# Patient Record
Sex: FEMALE | Race: WHITE | NOT HISPANIC OR LATINO | URBAN - METROPOLITAN AREA
[De-identification: names, ages, dates, MRNs, and addresses within clinical notes are randomized per-mention and may not be internally consistent; named-entity substitution may affect disease eponyms.]

---

## 2021-01-01 ENCOUNTER — EMERGENCY (EMERGENCY)
Facility: HOSPITAL | Age: 66
LOS: 1 days | Discharge: HOME | End: 2021-01-01
Attending: STUDENT IN AN ORGANIZED HEALTH CARE EDUCATION/TRAINING PROGRAM | Admitting: STUDENT IN AN ORGANIZED HEALTH CARE EDUCATION/TRAINING PROGRAM
Payer: COMMERCIAL

## 2021-01-01 VITALS
TEMPERATURE: 98 F | DIASTOLIC BLOOD PRESSURE: 85 MMHG | SYSTOLIC BLOOD PRESSURE: 145 MMHG | OXYGEN SATURATION: 100 % | HEART RATE: 80 BPM | RESPIRATION RATE: 16 BRPM

## 2021-01-01 VITALS
DIASTOLIC BLOOD PRESSURE: 93 MMHG | HEART RATE: 75 BPM | OXYGEN SATURATION: 100 % | SYSTOLIC BLOOD PRESSURE: 148 MMHG | TEMPERATURE: 98 F | RESPIRATION RATE: 17 BRPM

## 2021-01-01 DIAGNOSIS — Z96.643 PRESENCE OF ARTIFICIAL HIP JOINT, BILATERAL: Chronic | ICD-10-CM

## 2021-01-01 DIAGNOSIS — Z90.710 ACQUIRED ABSENCE OF BOTH CERVIX AND UTERUS: Chronic | ICD-10-CM

## 2021-01-01 DIAGNOSIS — R29.91 UNSPECIFIED SYMPTOMS AND SIGNS INVOLVING THE MUSCULOSKELETAL SYSTEM: Chronic | ICD-10-CM

## 2021-01-01 PROCEDURE — 99283 EMERGENCY DEPT VISIT LOW MDM: CPT

## 2021-01-01 RX ORDER — KETOROLAC TROMETHAMINE 30 MG/ML
30 SYRINGE (ML) INJECTION ONCE
Refills: 0 | Status: DISCONTINUED | OUTPATIENT
Start: 2021-01-01 | End: 2021-01-01

## 2021-01-01 RX ORDER — IBUPROFEN 200 MG
1 TABLET ORAL
Qty: 10 | Refills: 0
Start: 2021-01-01

## 2021-01-01 RX ORDER — LIDOCAINE 4 G/100G
1 CREAM TOPICAL ONCE
Refills: 0 | Status: COMPLETED | OUTPATIENT
Start: 2021-01-01 | End: 2021-01-01

## 2021-01-01 RX ORDER — OXYCODONE AND ACETAMINOPHEN 5; 325 MG/1; MG/1
1 TABLET ORAL ONCE
Refills: 0 | Status: DISCONTINUED | OUTPATIENT
Start: 2021-01-01 | End: 2021-01-01

## 2021-01-01 RX ORDER — CYCLOBENZAPRINE HYDROCHLORIDE 10 MG/1
5 TABLET, FILM COATED ORAL ONCE
Refills: 0 | Status: COMPLETED | OUTPATIENT
Start: 2021-01-01 | End: 2021-01-01

## 2021-01-01 RX ORDER — CYCLOBENZAPRINE HYDROCHLORIDE 10 MG/1
1 TABLET, FILM COATED ORAL
Qty: 10 | Refills: 0
Start: 2021-01-01

## 2021-01-01 RX ADMIN — LIDOCAINE 1 PATCH: 4 CREAM TOPICAL at 18:49

## 2021-01-01 RX ADMIN — Medication 30 MILLIGRAM(S): at 18:49

## 2021-01-01 RX ADMIN — CYCLOBENZAPRINE HYDROCHLORIDE 5 MILLIGRAM(S): 10 TABLET, FILM COATED ORAL at 18:52

## 2021-01-01 RX ADMIN — OXYCODONE AND ACETAMINOPHEN 1 TABLET(S): 5; 325 TABLET ORAL at 18:50

## 2021-01-01 NOTE — ED PROVIDER NOTE - OBJECTIVE STATEMENT
64 y/o female with a hx of Gout, HTN, Chronic back pain presents to the ER c/o 6 days of right sided low back pain radiating to her right leg.  Pt reports taking Tramadol at home with some relief.  Pt denies fall, trauma, fecal/urinary incontinence, fevers, chills, weakness, numbness, tingling.  Pt reports pain with walking/movement. Last pain medication was yesterday.

## 2021-01-01 NOTE — ED PROVIDER NOTE - PATIENT PORTAL LINK FT
You can access the FollowMyHealth Patient Portal offered by [facility] by registering at the following website: [email or web address]. By joining [employer]’s FollowMyHealth portal, you will also be able to view your health information using other applications (apps) compatible with our system.

## 2021-01-01 NOTE — ED PROVIDER NOTE - PROGRESS NOTE DETAILS
WILIAM Macedo: pt feels better pain improved pt ambulating with cane.  Discharge reviewed and discussed with patient.

## 2021-01-01 NOTE — ED PROVIDER NOTE - NSFOLLOWUPINSTRUCTIONS_ED_ALL_ED_FT
Follow up with your Doctor in 1-2 days.    Follow up with Spine Specialist in 1-2 days see attached list.  Heat to back.    Take Ibuprofen 600mg orally every 8 hours as needed for pain take with food.    Flexeril 5mg orally every 8 hours as needed for muscle spasm don't drive or drink alcohol while taking this medications.   Return to the ER for any persistent/worsening or new symptoms, weakness, numbness, difficulty urinating or any concerning symptoms.

## 2021-01-01 NOTE — ED ADULT TRIAGE NOTE - CHIEF COMPLAINT QUOTE
Patient states she has back pain since last week. Thought pain was caused by sciatica. Has been having difficulty walking since Monday. States pain is getting worse. Unable to bear weight on right leg. Denies falls or trauma. taking Tramadol at home but only helps slightly. Denies taking pain meds today. History of htn, bilateral hip replacements.

## 2021-01-01 NOTE — ED PROVIDER NOTE - PHYSICAL EXAMINATION
+TTP right sided paraspinal lumbar region, no midline TTP. +SLR(right), strength 5/5 all extremities, sensation equal and intact.

## 2021-01-01 NOTE — ED PROVIDER NOTE - RESPIRATORY, MLM
Afternoon glucose was 270. Pt refusing ordered sliding scale insulin. Writer educated pt on importance of glycemic control, but he continued to refuse. NP aware.    Breath sounds clear and equal bilaterally.

## 2021-01-01 NOTE — ED PROVIDER NOTE - PSH
Finding of knee region  b/l knee surgery  History of bilateral hip replacements    S/P hysterectomy

## 2021-01-01 NOTE — ED PROVIDER NOTE - CLINICAL SUMMARY MEDICAL DECISION MAKING FREE TEXT BOX
66 y/o female with a hx of Gout, HTN, Chronic back pain presents to the ER c/o 6 days of right sided low back pain radiating to her right leg. Pt is well appearing, NAD, normal neuro exam, exam consistent with sciatica, pain control, reassess, follow up PMD/Spine.

## 2021-01-01 NOTE — ED PROVIDER NOTE - ATTENDING CONTRIBUTION TO CARE
65F with pmh gout, HTN, chronic back pain, sciatica presenting with right sided back pain with radiation down right leg x 5-6 days with worsening pain. Tried taking home Tramadol with some relief but states increasing pain with difficulty ambulating. Endorses similar episodes in the past. Denies any inciting event or trauma. Denies fever, chills, cp, sob, nausea, vomiting, diarrhea, weakness, numbness, dysuria, urinary or bowel changes     GEN: NAD, awake, well appearing  HEENT: NCAT, MMM, normal conjunctiva, perrl  CHEST/LUNGS: Non-tachypneic, CTAB, bilateral breath sounds  CARDIAC: Non-tachycardic, s1s2, normal perfusion, no peripheral edema  ABDOMEN: Soft, NTND, No rebound/guarding  MSK: Pain reproducible with right leg movement. FROM of hips, knees and LE joints. Neurovasc intact. No joint tenderness, no gross deformity of extremities  SKIN: No rashes, no petechiae, no vesicles  NEURO: CN grossly intact, normal coordination, no focal motor or sensory deficits  PSYCH: Alert, appropriate, cooperative     Patient presenting with MSK pain likely acute on chronic exacerbation of lower back pain/sciatica. Will treat symptomatically and reassess.

## 2024-06-23 ENCOUNTER — EMERGENCY (EMERGENCY)
Facility: HOSPITAL | Age: 69
LOS: 1 days | Discharge: HOME | End: 2024-06-23
Attending: EMERGENCY MEDICINE
Payer: COMMERCIAL

## 2024-06-23 VITALS
DIASTOLIC BLOOD PRESSURE: 63 MMHG | OXYGEN SATURATION: 97 % | RESPIRATION RATE: 20 BRPM | HEART RATE: 69 BPM | SYSTOLIC BLOOD PRESSURE: 147 MMHG | TEMPERATURE: 99 F

## 2024-06-23 VITALS
OXYGEN SATURATION: 97 % | RESPIRATION RATE: 20 BRPM | HEART RATE: 63 BPM | SYSTOLIC BLOOD PRESSURE: 167 MMHG | HEIGHT: 66 IN | TEMPERATURE: 98 F | WEIGHT: 192.9 LBS | DIASTOLIC BLOOD PRESSURE: 88 MMHG

## 2024-06-23 DIAGNOSIS — Z96.643 PRESENCE OF ARTIFICIAL HIP JOINT, BILATERAL: Chronic | ICD-10-CM

## 2024-06-23 DIAGNOSIS — R29.91 UNSPECIFIED SYMPTOMS AND SIGNS INVOLVING THE MUSCULOSKELETAL SYSTEM: Chronic | ICD-10-CM

## 2024-06-23 DIAGNOSIS — Z90.710 ACQUIRED ABSENCE OF BOTH CERVIX AND UTERUS: Chronic | ICD-10-CM

## 2024-06-23 PROBLEM — I10 ESSENTIAL (PRIMARY) HYPERTENSION: Chronic | Status: ACTIVE | Noted: 2021-01-01

## 2024-06-23 PROBLEM — M54.9 DORSALGIA, UNSPECIFIED: Chronic | Status: ACTIVE | Noted: 2021-01-01

## 2024-06-23 LAB
ACETONE SERPL-MCNC: NEGATIVE — SIGNIFICANT CHANGE UP
ALBUMIN SERPL ELPH-MCNC: 3.7 G/DL — SIGNIFICANT CHANGE UP (ref 3.5–5)
ALP SERPL-CCNC: 105 U/L — SIGNIFICANT CHANGE UP (ref 40–120)
ALT FLD-CCNC: 30 U/L DA — SIGNIFICANT CHANGE UP (ref 10–60)
ANION GAP SERPL CALC-SCNC: 6 MMOL/L — SIGNIFICANT CHANGE UP (ref 5–17)
APPEARANCE UR: ABNORMAL
AST SERPL-CCNC: 25 U/L — SIGNIFICANT CHANGE UP (ref 10–40)
BACTERIA # UR AUTO: ABNORMAL /HPF
BASOPHILS # BLD AUTO: 0.05 K/UL — SIGNIFICANT CHANGE UP (ref 0–0.2)
BASOPHILS NFR BLD AUTO: 0.5 % — SIGNIFICANT CHANGE UP (ref 0–2)
BILIRUB SERPL-MCNC: 1.4 MG/DL — HIGH (ref 0.2–1.2)
BILIRUB UR-MCNC: ABNORMAL
BUN SERPL-MCNC: 16 MG/DL — SIGNIFICANT CHANGE UP (ref 7–18)
CALCIUM SERPL-MCNC: 9.6 MG/DL — SIGNIFICANT CHANGE UP (ref 8.4–10.5)
CHLORIDE SERPL-SCNC: 109 MMOL/L — HIGH (ref 96–108)
CO2 SERPL-SCNC: 26 MMOL/L — SIGNIFICANT CHANGE UP (ref 22–31)
COLOR SPEC: SIGNIFICANT CHANGE UP
CREAT SERPL-MCNC: 0.84 MG/DL — SIGNIFICANT CHANGE UP (ref 0.5–1.3)
DIFF PNL FLD: ABNORMAL
EGFR: 75 ML/MIN/1.73M2 — SIGNIFICANT CHANGE UP
EOSINOPHIL # BLD AUTO: 0.05 K/UL — SIGNIFICANT CHANGE UP (ref 0–0.5)
EOSINOPHIL NFR BLD AUTO: 0.5 % — SIGNIFICANT CHANGE UP (ref 0–6)
EPI CELLS # UR: PRESENT
GLUCOSE SERPL-MCNC: 105 MG/DL — HIGH (ref 70–99)
GLUCOSE UR QL: NEGATIVE MG/DL — SIGNIFICANT CHANGE UP
HCT VFR BLD CALC: 50.7 % — HIGH (ref 34.5–45)
HGB BLD-MCNC: 16.6 G/DL — HIGH (ref 11.5–15.5)
HYALINE CASTS # UR AUTO: PRESENT
IMM GRANULOCYTES NFR BLD AUTO: 0.2 % — SIGNIFICANT CHANGE UP (ref 0–0.9)
KETONES UR-MCNC: 15 MG/DL
LEUKOCYTE ESTERASE UR-ACNC: ABNORMAL
LIDOCAIN IGE QN: 28 U/L — SIGNIFICANT CHANGE UP (ref 13–75)
LYMPHOCYTES # BLD AUTO: 1.95 K/UL — SIGNIFICANT CHANGE UP (ref 1–3.3)
LYMPHOCYTES # BLD AUTO: 20.2 % — SIGNIFICANT CHANGE UP (ref 13–44)
MAGNESIUM SERPL-MCNC: 2.3 MG/DL — SIGNIFICANT CHANGE UP (ref 1.6–2.6)
MCHC RBC-ENTMCNC: 30.6 PG — SIGNIFICANT CHANGE UP (ref 27–34)
MCHC RBC-ENTMCNC: 32.7 GM/DL — SIGNIFICANT CHANGE UP (ref 32–36)
MCV RBC AUTO: 93.5 FL — SIGNIFICANT CHANGE UP (ref 80–100)
MONOCYTES # BLD AUTO: 0.71 K/UL — SIGNIFICANT CHANGE UP (ref 0–0.9)
MONOCYTES NFR BLD AUTO: 7.4 % — SIGNIFICANT CHANGE UP (ref 2–14)
NEUTROPHILS # BLD AUTO: 6.85 K/UL — SIGNIFICANT CHANGE UP (ref 1.8–7.4)
NEUTROPHILS NFR BLD AUTO: 71.2 % — SIGNIFICANT CHANGE UP (ref 43–77)
NITRITE UR-MCNC: NEGATIVE — SIGNIFICANT CHANGE UP
NRBC # BLD: 0 /100 WBCS — SIGNIFICANT CHANGE UP (ref 0–0)
PH UR: 6.5 — SIGNIFICANT CHANGE UP (ref 5–8)
PLATELET # BLD AUTO: 216 K/UL — SIGNIFICANT CHANGE UP (ref 150–400)
POTASSIUM SERPL-MCNC: 3.9 MMOL/L — SIGNIFICANT CHANGE UP (ref 3.5–5.3)
POTASSIUM SERPL-SCNC: 3.9 MMOL/L — SIGNIFICANT CHANGE UP (ref 3.5–5.3)
PROT SERPL-MCNC: 7.6 G/DL — SIGNIFICANT CHANGE UP (ref 6–8.3)
PROT UR-MCNC: ABNORMAL MG/DL
RBC # BLD: 5.42 M/UL — HIGH (ref 3.8–5.2)
RBC # FLD: 14.1 % — SIGNIFICANT CHANGE UP (ref 10.3–14.5)
RBC CASTS # UR COMP ASSIST: 2 /HPF — SIGNIFICANT CHANGE UP (ref 0–4)
SODIUM SERPL-SCNC: 141 MMOL/L — SIGNIFICANT CHANGE UP (ref 135–145)
SP GR SPEC: 1.02 — SIGNIFICANT CHANGE UP (ref 1–1.03)
TROPONIN I, HIGH SENSITIVITY RESULT: 49.7 NG/L — SIGNIFICANT CHANGE UP
TROPONIN I, HIGH SENSITIVITY RESULT: 54.7 NG/L — HIGH
UROBILINOGEN FLD QL: 1 MG/DL — SIGNIFICANT CHANGE UP (ref 0.2–1)
WBC # BLD: 9.63 K/UL — SIGNIFICANT CHANGE UP (ref 3.8–10.5)
WBC # FLD AUTO: 9.63 K/UL — SIGNIFICANT CHANGE UP (ref 3.8–10.5)
WBC UR QL: 7 /HPF — HIGH (ref 0–5)

## 2024-06-23 PROCEDURE — 81001 URINALYSIS AUTO W/SCOPE: CPT

## 2024-06-23 PROCEDURE — 83735 ASSAY OF MAGNESIUM: CPT

## 2024-06-23 PROCEDURE — 87086 URINE CULTURE/COLONY COUNT: CPT

## 2024-06-23 PROCEDURE — 83690 ASSAY OF LIPASE: CPT

## 2024-06-23 PROCEDURE — 99285 EMERGENCY DEPT VISIT HI MDM: CPT

## 2024-06-23 PROCEDURE — 96375 TX/PRO/DX INJ NEW DRUG ADDON: CPT

## 2024-06-23 PROCEDURE — 93010 ELECTROCARDIOGRAM REPORT: CPT

## 2024-06-23 PROCEDURE — 36415 COLL VENOUS BLD VENIPUNCTURE: CPT

## 2024-06-23 PROCEDURE — 96374 THER/PROPH/DIAG INJ IV PUSH: CPT

## 2024-06-23 PROCEDURE — 82009 KETONE BODYS QUAL: CPT

## 2024-06-23 PROCEDURE — 71045 X-RAY EXAM CHEST 1 VIEW: CPT

## 2024-06-23 PROCEDURE — 93005 ELECTROCARDIOGRAM TRACING: CPT

## 2024-06-23 PROCEDURE — 71045 X-RAY EXAM CHEST 1 VIEW: CPT | Mod: 26

## 2024-06-23 PROCEDURE — 84484 ASSAY OF TROPONIN QUANT: CPT

## 2024-06-23 PROCEDURE — 85025 COMPLETE CBC W/AUTO DIFF WBC: CPT

## 2024-06-23 PROCEDURE — 99285 EMERGENCY DEPT VISIT HI MDM: CPT | Mod: 25

## 2024-06-23 PROCEDURE — 80053 COMPREHEN METABOLIC PANEL: CPT

## 2024-06-23 RX ORDER — FAMOTIDINE 10 MG/ML
1 INJECTION INTRAVENOUS
Qty: 60 | Refills: 0
Start: 2024-06-23 | End: 2024-07-22

## 2024-06-23 RX ORDER — ASPIRIN/CALCIUM CARB/MAGNESIUM 324 MG
162 TABLET ORAL ONCE
Refills: 0 | Status: COMPLETED | OUTPATIENT
Start: 2024-06-23 | End: 2024-06-23

## 2024-06-23 RX ORDER — FAMOTIDINE 10 MG/ML
20 INJECTION INTRAVENOUS ONCE
Refills: 0 | Status: COMPLETED | OUTPATIENT
Start: 2024-06-23 | End: 2024-06-23

## 2024-06-23 RX ORDER — SODIUM CHLORIDE 9 MG/ML
1000 INJECTION INTRAMUSCULAR; INTRAVENOUS; SUBCUTANEOUS
Refills: 0 | Status: ACTIVE | OUTPATIENT
Start: 2024-06-23 | End: 2025-05-22

## 2024-06-23 RX ORDER — ONDANSETRON 8 MG/1
1 TABLET, FILM COATED ORAL
Qty: 15 | Refills: 0
Start: 2024-06-23 | End: 2024-06-27

## 2024-06-23 RX ORDER — ONDANSETRON 8 MG/1
4 TABLET, FILM COATED ORAL ONCE
Refills: 0 | Status: COMPLETED | OUTPATIENT
Start: 2024-06-23 | End: 2024-06-23

## 2024-06-23 RX ADMIN — ONDANSETRON 4 MILLIGRAM(S): 8 TABLET, FILM COATED ORAL at 13:16

## 2024-06-23 RX ADMIN — FAMOTIDINE 20 MILLIGRAM(S): 10 INJECTION INTRAVENOUS at 13:13

## 2024-06-23 RX ADMIN — Medication 162 MILLIGRAM(S): at 14:34

## 2024-06-23 RX ADMIN — SODIUM CHLORIDE 125 MILLILITER(S): 9 INJECTION INTRAMUSCULAR; INTRAVENOUS; SUBCUTANEOUS at 13:13

## 2024-06-23 NOTE — ED ADULT NURSE NOTE - OBJECTIVE STATEMENT
Pt c/o abdominal pain/soft bowel movement/nausea since Friday 6/21/24 with loss appetite. Pt was able to drink tea last night and woke up this morning with nausea. Pt currently feeling weak and shakiness. Pt denies dizziness/lightheaded/ Pt PMH: high blood pressure. No acute distress noted. Pt c/o abdominal pain/soft bowel movement/nausea since Friday 6/21/24 with loss appetite. Pt was able to drink tea last night and woke up this morning with nausea and 3 loose bowel movements today. Pt currently feeling weak w/ shakiness. Pt denies dizziness/lightheaded/ Pt PMH: high blood pressure. No acute distress noted. Pt c/o abdominal pain/soft bowel movement/nausea since Friday 6/21/24 with loss appetite. Pt was able to drink tea last night and woke up this morning with nausea and 3 loose bowel movements today. Pt currently feeling weak w/ shakiness. Pt denies recent travel/dizziness/lightheaded/ Pt PMH: high blood pressure. No acute distress noted.

## 2024-06-23 NOTE — ED PROVIDER NOTE - PATIENT PORTAL LINK FT
You can access the FollowMyHealth Patient Portal offered by [facility] by registering at the following website: [email or web address]. By joining [employer]’s FollowMyHealth portal, you will also be able to view your health information using other applications (apps) compatible with our system.

## 2024-06-23 NOTE — ED PROVIDER NOTE - PROGRESS NOTE DETAILS
Labs/EKG explained to patient   patient's troponin slightly elevated, will get second troponin in a few hours   patient claims she is feeling a little better Patient is feeling better   repeat troponin is negative, will DC home   advised to follow-up with GI and cardiology   patient prefers getting a referral from her doctor  Patient with no urinary symptoms, UA mostly from contamination

## 2024-06-23 NOTE — ED ADULT NURSE NOTE - HIV OFFER
Problem: At Risk for Falls  Goal: # Patient does not fall  Outcome: Outcome Met, Complete Goal  Goal: # Takes action to control fall-related risks  Outcome: Outcome Met, Complete Goal  Goal: # Verbalizes understanding of fall risk/precautions  Description: Document education using the patient education activity  Outcome: Outcome Met, Complete Goal     Problem: At Risk for Injury Due to Fall  Goal: # Patient does not fall  Outcome: Outcome Met, Complete Goal  Goal: # Takes action to control condition specific risks  Outcome: Outcome Met, Complete Goal  Goal: # Verbalizes understanding of fall-related injury personal risks  Description: Document education using the patient education activity  Outcome: Outcome Met, Complete Goal      Opt out

## 2024-06-23 NOTE — ED PROVIDER NOTE - NSICDXPASTSURGICALHX_GEN_ALL_CORE_FT
PAST SURGICAL HISTORY:  Finding of knee region b/l knee surgery    History of bilateral hip replacements     S/P hysterectomy

## 2024-06-23 NOTE — ED PROVIDER NOTE - CLINICAL SUMMARY MEDICAL DECISION MAKING FREE TEXT BOX
68-year-old female with history of HTN, complaining of constant nonradiating epigastric pain, nauseous, sweatiness, concern for gastritis, ACS given history of HTN and is postmenopausal, unlikely cholelithiasis, pancreatitis.  Will get labs, EKG, give Pepcid, IV fluids and reassess

## 2024-06-23 NOTE — ED ADULT NURSE NOTE - NSFALLUNIVINTERV_ED_ALL_ED
Bed/Stretcher in lowest position, wheels locked, appropriate side rails in place/Call bell, personal items and telephone in reach/Instruct patient to call for assistance before getting out of bed/chair/stretcher/Non-slip footwear applied when patient is off stretcher/[place] to call system/Physically safe environment - no spills, clutter or unnecessary equipment/Purposeful proactive rounding/Room/bathroom lighting operational, light cord in reach

## 2024-06-23 NOTE — ED PROVIDER NOTE - NSFOLLOWUPINSTRUCTIONS_ED_ALL_ED_FT
Gastritis, Adult  Outline of an adult's lower body with a close-up of the stomach, showing inflammation and an ulcer inside the stomach.   Gastritis is inflammation of the stomach. There are two kinds of gastritis:  Acute gastritis. This kind develops suddenly.  Chronic gastritis. This kind is much more common. It develops slowly and lasts for a long time.  Gastritis happens when the lining of the stomach becomes weak or gets damaged. Without treatment, gastritis can lead to stomach bleeding and ulcers.    What are the causes?  This condition may be caused by:  An infection.  Drinking too much alcohol.  Certain medicines. These include steroids, antibiotics, and some over-the-counter medicines, such as aspirin or ibuprofen.  Having too much acid in the stomach.  Having a disease of the stomach.  Other causes may include:  An allergic reaction.  Some cancer treatments (radiation).  Smoking cigarettes or the use of products that contain nicotine or tobacco.  In some cases, the cause of this condition is not known.    What increases the risk?  Having a disease of the intestines.  Having a disease in which the body's immune system attacks the body (autoimmune disease), such as Crohn's disease.  Using aspirin or ibuprofen and other NSAIDs to treat other conditions, such as heart disease or chronic pain.  Stress.  What are the signs or symptoms?  Symptoms of this condition include:  Pain or a burning sensation in the upper abdomen.  Nausea.  Vomiting.  An uncomfortable feeling of fullness after eating.  Weight loss.  Bad breath.  Blood in your vomit or stool (feces).  In some cases, there are no symptoms.    How is this diagnosed?  This condition may be diagnosed based on your medical history, a physical exam, and tests. Tests may include:  Your medical history and a description of your symptoms.  A physical exam.  Tests. These can include:  Blood tests.  Stool tests.  A test in which a thin, flexible instrument with a light and a camera is passed down the esophagus and into the stomach (upper endoscopy).  A test in which a tissue sample is removed to look at it under a microscope (biopsy).  How is this treated?  This condition may be treated with medicines. The medicines that are used vary depending on the cause of the gastritis.  If the condition is caused by a bacterial infection, you may be given antibiotic medicines.  If the condition is caused by too much acid in the stomach, you may be given medicines called H2 blockers, proton pump inhibitors, or antacids.  Treatment may also involve stopping the use of certain medicines such as aspirin or ibuprofen and other NSAIDs.    Follow these instructions at home:  Medicines    Take over-the-counter and prescription medicines only as told by your health care provider.  If you were prescribed an antibiotic medicine, take it as told by your health care provider. Do not stop taking the antibiotic even if you start to feel better.  Alcohol use    Do not drink alcohol if:  Your health care provider tells you not to drink.  You are pregnant, may be pregnant, or are planning to become pregnant.  If you drink alcohol:  Limit your use to:  0–1 drink a day for women.  0–2 drinks a day for men.  Know how much alcohol is in your drink. In the [place] one drink equals one 12 oz bottle of beer (355 mL), one 5 oz glass of wine (148 mL), or one 1½ oz glass of hard liquor (44 mL).  General instructions    A comparison of three sample cups showing dark yellow, yellow, and pale yellow urine.  Eat small, frequent meals instead of large meals.  Avoid foods and drinks that make your symptoms worse.  Talk with your health care provider about ways to manage stress, such as getting regular exercise or practicing deep breathing, meditation, or yoga.  Do not use any products that contain nicotine or tobacco. These products include cigarettes, chewing tobacco, and vaping devices, such as e-cigarettes. If you need help quitting, ask your health care provider.  Drink enough fluid to keep your urine pale yellow.  Keep all follow-up visits. This is important.  Contact a health care provider if:  Your symptoms get worse.  Your abdominal pain gets worse.  Your symptoms return after treatment.  You have a fever.  Get help right away if:  You vomit blood or a substance that looks like coffee grounds.  You have black or dark red stools.  You are unable to keep fluids down.  These symptoms may represent a serious problem that is an emergency. Do not wait to see if the symptoms will go away. Get medical help right away. Call your local emergency services (911 in the [place] Do not drive yourself to the hospital.    Summary  Gastritis is inflammation of the lining of the stomach that can occur suddenly (acute) or develop slowly over time (chronic).  This condition is diagnosed with a medical history, a physical exam, or tests.  This condition may be treated with medicines to treat infection or medicines to reduce the amount of acid in your stomach.  Follow your health care provider's instructions about taking medicines, making changes to your diet, and knowing when to call for help.  This information is not intended to replace advice given to you by your health care provider. Make sure you discuss any questions you have with your health care provider.      Take famotidine half hour before breakfast and half hour before dinner   you must follow-up with stomach specialist for endoscopy and colonoscopy, and cardiologist for possible stress test

## 2024-06-23 NOTE — ED PROVIDER NOTE - OBJECTIVE STATEMENT
Seven 68-year-old female with history of HTN.  Patient claims on Thursday night, she woke up around 2 AM with nauseousness and had a bowel movement.  On Friday a.m., patient continued not feeling well, feeling nauseous with few episodes of soft stool, no BRBPR, fever, vomiting, dysuria, but endorsed with sweatiness, constant nonradiating epigastric pain.  Patient never had EGD or colonoscopy

## 2024-06-24 LAB
CULTURE RESULTS: SIGNIFICANT CHANGE UP
SPECIMEN SOURCE: SIGNIFICANT CHANGE UP

## 2025-05-21 ENCOUNTER — INPATIENT (INPATIENT)
Facility: HOSPITAL | Age: 70
LOS: 3 days | Discharge: HOME | DRG: 948 | End: 2025-05-25
Attending: STUDENT IN AN ORGANIZED HEALTH CARE EDUCATION/TRAINING PROGRAM | Admitting: STUDENT IN AN ORGANIZED HEALTH CARE EDUCATION/TRAINING PROGRAM
Payer: MEDICARE

## 2025-05-21 VITALS
DIASTOLIC BLOOD PRESSURE: 101 MMHG | HEART RATE: 59 BPM | SYSTOLIC BLOOD PRESSURE: 180 MMHG | WEIGHT: 175.05 LBS | RESPIRATION RATE: 17 BRPM | OXYGEN SATURATION: 98 % | TEMPERATURE: 97 F

## 2025-05-21 DIAGNOSIS — N39.0 URINARY TRACT INFECTION, SITE NOT SPECIFIED: ICD-10-CM

## 2025-05-21 DIAGNOSIS — R79.89 OTHER SPECIFIED ABNORMAL FINDINGS OF BLOOD CHEMISTRY: ICD-10-CM

## 2025-05-21 DIAGNOSIS — Z96.643 PRESENCE OF ARTIFICIAL HIP JOINT, BILATERAL: Chronic | ICD-10-CM

## 2025-05-21 DIAGNOSIS — R07.89 OTHER CHEST PAIN: ICD-10-CM

## 2025-05-21 DIAGNOSIS — I10 ESSENTIAL (PRIMARY) HYPERTENSION: ICD-10-CM

## 2025-05-21 DIAGNOSIS — R29.91 UNSPECIFIED SYMPTOMS AND SIGNS INVOLVING THE MUSCULOSKELETAL SYSTEM: Chronic | ICD-10-CM

## 2025-05-21 DIAGNOSIS — Z29.9 ENCOUNTER FOR PROPHYLACTIC MEASURES, UNSPECIFIED: ICD-10-CM

## 2025-05-21 DIAGNOSIS — Z90.710 ACQUIRED ABSENCE OF BOTH CERVIX AND UTERUS: Chronic | ICD-10-CM

## 2025-05-21 DIAGNOSIS — M54.30 SCIATICA, UNSPECIFIED SIDE: ICD-10-CM

## 2025-05-21 DIAGNOSIS — R07.9 CHEST PAIN, UNSPECIFIED: ICD-10-CM

## 2025-05-21 DIAGNOSIS — Z87.19 PERSONAL HISTORY OF OTHER DISEASES OF THE DIGESTIVE SYSTEM: ICD-10-CM

## 2025-05-21 LAB
ALBUMIN SERPL ELPH-MCNC: 3.6 G/DL — SIGNIFICANT CHANGE UP (ref 3.5–5)
ALP SERPL-CCNC: 89 U/L — SIGNIFICANT CHANGE UP (ref 40–120)
ALT FLD-CCNC: 42 U/L DA — SIGNIFICANT CHANGE UP (ref 10–60)
ANION GAP SERPL CALC-SCNC: 4 MMOL/L — LOW (ref 5–17)
APPEARANCE UR: CLEAR — SIGNIFICANT CHANGE UP
AST SERPL-CCNC: 31 U/L — SIGNIFICANT CHANGE UP (ref 10–40)
BACTERIA # UR AUTO: ABNORMAL /HPF
BASOPHILS # BLD AUTO: 0.03 K/UL — SIGNIFICANT CHANGE UP (ref 0–0.2)
BASOPHILS NFR BLD AUTO: 0.6 % — SIGNIFICANT CHANGE UP (ref 0–2)
BILIRUB SERPL-MCNC: 0.8 MG/DL — SIGNIFICANT CHANGE UP (ref 0.2–1.2)
BILIRUB UR-MCNC: NEGATIVE — SIGNIFICANT CHANGE UP
BUN SERPL-MCNC: 12 MG/DL — SIGNIFICANT CHANGE UP (ref 7–18)
CALCIUM SERPL-MCNC: 8.8 MG/DL — SIGNIFICANT CHANGE UP (ref 8.4–10.5)
CHLORIDE SERPL-SCNC: 108 MMOL/L — SIGNIFICANT CHANGE UP (ref 96–108)
CO2 SERPL-SCNC: 28 MMOL/L — SIGNIFICANT CHANGE UP (ref 22–31)
COLOR SPEC: YELLOW — SIGNIFICANT CHANGE UP
CREAT SERPL-MCNC: 0.77 MG/DL — SIGNIFICANT CHANGE UP (ref 0.5–1.3)
DIFF PNL FLD: ABNORMAL
EGFR: 82 ML/MIN/1.73M2 — SIGNIFICANT CHANGE UP
EGFR: 82 ML/MIN/1.73M2 — SIGNIFICANT CHANGE UP
EOSINOPHIL # BLD AUTO: 0.03 K/UL — SIGNIFICANT CHANGE UP (ref 0–0.5)
EOSINOPHIL NFR BLD AUTO: 0.6 % — SIGNIFICANT CHANGE UP (ref 0–6)
EPI CELLS # UR: PRESENT
GLUCOSE SERPL-MCNC: 87 MG/DL — SIGNIFICANT CHANGE UP (ref 70–99)
GLUCOSE UR QL: NEGATIVE MG/DL — SIGNIFICANT CHANGE UP
HCT VFR BLD CALC: 47.6 % — HIGH (ref 34.5–45)
HGB BLD-MCNC: 15.5 G/DL — SIGNIFICANT CHANGE UP (ref 11.5–15.5)
IMM GRANULOCYTES NFR BLD AUTO: 0.2 % — SIGNIFICANT CHANGE UP (ref 0–0.9)
KETONES UR QL: ABNORMAL MG/DL
LACTATE SERPL-SCNC: 1 MMOL/L — SIGNIFICANT CHANGE UP (ref 0.7–2)
LEUKOCYTE ESTERASE UR-ACNC: ABNORMAL
LIDOCAIN IGE QN: 23 U/L — SIGNIFICANT CHANGE UP (ref 13–75)
LYMPHOCYTES # BLD AUTO: 1.13 K/UL — SIGNIFICANT CHANGE UP (ref 1–3.3)
LYMPHOCYTES # BLD AUTO: 22.8 % — SIGNIFICANT CHANGE UP (ref 13–44)
MCHC RBC-ENTMCNC: 30.5 PG — SIGNIFICANT CHANGE UP (ref 27–34)
MCHC RBC-ENTMCNC: 32.6 G/DL — SIGNIFICANT CHANGE UP (ref 32–36)
MCV RBC AUTO: 93.5 FL — SIGNIFICANT CHANGE UP (ref 80–100)
MONOCYTES # BLD AUTO: 0.47 K/UL — SIGNIFICANT CHANGE UP (ref 0–0.9)
MONOCYTES NFR BLD AUTO: 9.5 % — SIGNIFICANT CHANGE UP (ref 2–14)
NEUTROPHILS # BLD AUTO: 3.29 K/UL — SIGNIFICANT CHANGE UP (ref 1.8–7.4)
NEUTROPHILS NFR BLD AUTO: 66.3 % — SIGNIFICANT CHANGE UP (ref 43–77)
NITRITE UR-MCNC: NEGATIVE — SIGNIFICANT CHANGE UP
NRBC BLD AUTO-RTO: 0 /100 WBCS — SIGNIFICANT CHANGE UP (ref 0–0)
PH UR: 7.5 — SIGNIFICANT CHANGE UP (ref 5–8)
PLATELET # BLD AUTO: 173 K/UL — SIGNIFICANT CHANGE UP (ref 150–400)
POTASSIUM SERPL-MCNC: 3.6 MMOL/L — SIGNIFICANT CHANGE UP (ref 3.5–5.3)
POTASSIUM SERPL-SCNC: 3.6 MMOL/L — SIGNIFICANT CHANGE UP (ref 3.5–5.3)
PROT SERPL-MCNC: 6.9 G/DL — SIGNIFICANT CHANGE UP (ref 6–8.3)
PROT UR-MCNC: NEGATIVE MG/DL — SIGNIFICANT CHANGE UP
RBC # BLD: 5.09 M/UL — SIGNIFICANT CHANGE UP (ref 3.8–5.2)
RBC # FLD: 13.5 % — SIGNIFICANT CHANGE UP (ref 10.3–14.5)
RBC CASTS # UR COMP ASSIST: 3 /HPF — SIGNIFICANT CHANGE UP (ref 0–4)
SODIUM SERPL-SCNC: 140 MMOL/L — SIGNIFICANT CHANGE UP (ref 135–145)
SP GR SPEC: 1.02 — SIGNIFICANT CHANGE UP (ref 1–1.03)
TROPONIN I, HIGH SENSITIVITY RESULT: 74.2 NG/L — HIGH
TROPONIN I, HIGH SENSITIVITY RESULT: 76.2 NG/L — HIGH
UROBILINOGEN FLD QL: 4 MG/DL (ref 0.2–1)
WBC # BLD: 4.96 K/UL — SIGNIFICANT CHANGE UP (ref 3.8–10.5)
WBC # FLD AUTO: 4.96 K/UL — SIGNIFICANT CHANGE UP (ref 3.8–10.5)
WBC UR QL: 20 /HPF — HIGH (ref 0–5)

## 2025-05-21 PROCEDURE — 99223 1ST HOSP IP/OBS HIGH 75: CPT | Mod: GC

## 2025-05-21 PROCEDURE — 99291 CRITICAL CARE FIRST HOUR: CPT

## 2025-05-21 PROCEDURE — 74177 CT ABD & PELVIS W/CONTRAST: CPT | Mod: 26

## 2025-05-21 RX ORDER — CEFTRIAXONE 500 MG/1
1000 INJECTION, POWDER, FOR SOLUTION INTRAMUSCULAR; INTRAVENOUS ONCE
Refills: 0 | Status: COMPLETED | OUTPATIENT
Start: 2025-05-21 | End: 2025-05-21

## 2025-05-21 RX ORDER — LOSARTAN POTASSIUM 100 MG/1
50 TABLET, FILM COATED ORAL DAILY
Refills: 0 | Status: DISCONTINUED | OUTPATIENT
Start: 2025-05-21 | End: 2025-05-22

## 2025-05-21 RX ORDER — OXYCODONE HYDROCHLORIDE 30 MG/1
5 TABLET ORAL EVERY 6 HOURS
Refills: 0 | Status: DISCONTINUED | OUTPATIENT
Start: 2025-05-21 | End: 2025-05-25

## 2025-05-21 RX ORDER — MELATONIN 5 MG
3 TABLET ORAL AT BEDTIME
Refills: 0 | Status: DISCONTINUED | OUTPATIENT
Start: 2025-05-21 | End: 2025-05-25

## 2025-05-21 RX ORDER — ROSUVASTATIN CALCIUM 20 MG/1
20 TABLET, FILM COATED ORAL AT BEDTIME
Refills: 0 | Status: DISCONTINUED | OUTPATIENT
Start: 2025-05-21 | End: 2025-05-25

## 2025-05-21 RX ORDER — ROSUVASTATIN CALCIUM 20 MG/1
1 TABLET, FILM COATED ORAL
Refills: 0 | DISCHARGE

## 2025-05-21 RX ORDER — PREGABALIN 50 MG/1
1 CAPSULE ORAL
Refills: 0 | DISCHARGE

## 2025-05-21 RX ORDER — ONDANSETRON HCL/PF 4 MG/2 ML
4 VIAL (ML) INJECTION ONCE
Refills: 0 | Status: COMPLETED | OUTPATIENT
Start: 2025-05-21 | End: 2025-05-21

## 2025-05-21 RX ORDER — POLYETHYLENE GLYCOL 3350 17 G/17G
17 POWDER, FOR SOLUTION ORAL DAILY
Refills: 0 | Status: DISCONTINUED | OUTPATIENT
Start: 2025-05-21 | End: 2025-05-25

## 2025-05-21 RX ORDER — TRAMADOL HYDROCHLORIDE 50 MG/1
1 TABLET, FILM COATED ORAL
Refills: 0 | DISCHARGE

## 2025-05-21 RX ORDER — PREGABALIN 50 MG/1
100 CAPSULE ORAL
Refills: 0 | Status: DISCONTINUED | OUTPATIENT
Start: 2025-05-21 | End: 2025-05-25

## 2025-05-21 RX ORDER — CEFTRIAXONE 500 MG/1
1000 INJECTION, POWDER, FOR SOLUTION INTRAMUSCULAR; INTRAVENOUS EVERY 24 HOURS
Refills: 0 | Status: COMPLETED | OUTPATIENT
Start: 2025-05-22 | End: 2025-05-24

## 2025-05-21 RX ORDER — ASPIRIN 325 MG
324 TABLET ORAL ONCE
Refills: 0 | Status: COMPLETED | OUTPATIENT
Start: 2025-05-21 | End: 2025-05-21

## 2025-05-21 RX ORDER — SUCRALFATE 1 G
1 TABLET ORAL
Refills: 0 | Status: DISCONTINUED | OUTPATIENT
Start: 2025-05-21 | End: 2025-05-25

## 2025-05-21 RX ORDER — NIFEDIPINE 30 MG
30 TABLET, EXTENDED RELEASE 24 HR ORAL DAILY
Refills: 0 | Status: DISCONTINUED | OUTPATIENT
Start: 2025-05-21 | End: 2025-05-22

## 2025-05-21 RX ORDER — ENOXAPARIN SODIUM 100 MG/ML
40 INJECTION SUBCUTANEOUS EVERY 24 HOURS
Refills: 0 | Status: DISCONTINUED | OUTPATIENT
Start: 2025-05-21 | End: 2025-05-25

## 2025-05-21 RX ORDER — SUCRALFATE 1 G
1 TABLET ORAL
Refills: 0 | DISCHARGE

## 2025-05-21 RX ORDER — ENALAPRIL MALEATE 20 MG
1.25 TABLET ORAL ONCE
Refills: 0 | Status: COMPLETED | OUTPATIENT
Start: 2025-05-21 | End: 2025-05-21

## 2025-05-21 RX ADMIN — Medication 3 MILLIGRAM(S): at 22:26

## 2025-05-21 RX ADMIN — Medication 1000 MILLILITER(S): at 14:37

## 2025-05-21 RX ADMIN — PREGABALIN 100 MILLIGRAM(S): 50 CAPSULE ORAL at 17:32

## 2025-05-21 RX ADMIN — Medication 1 GRAM(S): at 17:32

## 2025-05-21 RX ADMIN — OXYCODONE HYDROCHLORIDE 5 MILLIGRAM(S): 30 TABLET ORAL at 22:24

## 2025-05-21 RX ADMIN — Medication 4 MILLIGRAM(S): at 14:35

## 2025-05-21 RX ADMIN — Medication 1.25 MILLIGRAM(S): at 16:58

## 2025-05-21 RX ADMIN — OXYCODONE HYDROCHLORIDE 5 MILLIGRAM(S): 30 TABLET ORAL at 23:22

## 2025-05-21 RX ADMIN — ENOXAPARIN SODIUM 40 MILLIGRAM(S): 100 INJECTION SUBCUTANEOUS at 16:58

## 2025-05-21 RX ADMIN — ROSUVASTATIN CALCIUM 20 MILLIGRAM(S): 20 TABLET, FILM COATED ORAL at 22:25

## 2025-05-21 RX ADMIN — Medication 324 MILLIGRAM(S): at 16:58

## 2025-05-21 RX ADMIN — CEFTRIAXONE 100 MILLIGRAM(S): 500 INJECTION, POWDER, FOR SOLUTION INTRAMUSCULAR; INTRAVENOUS at 16:24

## 2025-05-21 NOTE — ED ADULT TRIAGE NOTE - BP NONINVASIVE DIASTOLIC (MM HG)
101 Saucerization Excision Additional Text (Leave Blank If You Do Not Want): The margin was drawn around the clinically apparent lesion.  Incisions were then made along these lines, in a tangential fashion, to the appropriate tissue plane and the lesion was extirpated.

## 2025-05-21 NOTE — H&P ADULT - PROBLEM SELECTOR PLAN 6
reports hx of gastritis but EGD last year that was normal  continue home meds___ reports hx of gastritis but EGD last year that was normal  continue home meds famotidine, sucralfate

## 2025-05-21 NOTE — H&P ADULT - NSICDXPASTMEDICALHX_GEN_ALL_CORE_FT
PAST MEDICAL HISTORY:  Chronic back pain     History of gastritis     HTN (hypertension)     Sciatica

## 2025-05-21 NOTE — ED PROVIDER NOTE - CLINICAL SUMMARY MEDICAL DECISION MAKING FREE TEXT BOX
ATTG: : Assessment/plan: Abdominal discomfort with associated nausea no vomiting.  Will check labs, given her abdominal tenderness will obtain a CT of the abdomen pelvis, check urine, IV fluids, pain medication, antiemetics, reeval for dispo.

## 2025-05-21 NOTE — H&P ADULT - PROBLEM SELECTOR PLAN 3
BP /101 in ED  s/p enalaprilat 1.25 IV in ED  Home meds ____ /101 in ED  Pt was previously on losartan at home  s/p enalaprilat 1.25 IV in ED  Start stat losartan 50 QD PO  Start stat nifedipine 30 QD PO

## 2025-05-21 NOTE — H&P ADULT - PROBLEM SELECTOR PLAN 4
c/o chills and abdominal pain  s/p CTX and 1L NS in ED  continue CTX  f/u urine cx and sensitivity  f/u blood cx

## 2025-05-21 NOTE — H&P ADULT - ASSESSMENT
69F with PMH HTN, sciatica, is admitted for AC r/o and UTI, also with /101. 69F with PMH HTN, sciatica, is admitted for ACS r/o and UTI, also with /101.

## 2025-05-21 NOTE — H&P ADULT - ATTENDING COMMENTS
I have seen and evaluated the patient in the ED.      She says that she is coming in for “gastritis problems.” She said she was diagnosed with this about one year ago following an EGD with Dr. Germain but was told she “didn’t have any ulcers.” She had been doing well but then abdominal pain flared up two days ago and has not abated so she decided to come in. She is not having any chest pain or dyspnea. The abdominal pain is located all throughout the abdomen. She denies any dysuria, urinary urgency or frequency. No fever at home.      She says that her BP does tend to be elevated when she is sick but “never this high.”      On exam, she is afebrile, HR in 40s-50s, BP elevated to 190/78. She is saturating well on room air. No JVD, cardiac murmurs. Abdomen is soft, nondistended and nontender to palpation. No LE pitting edema or rashes.      I have reviewed her CBC, BMP, urinalysis. WBC is normal 4.9. No anemia. Na, K, Cr normal. Troponin elevated at 74. Repeat pending.      Urinalysis notable for positive LE and pyuria with 20 WBC per HPF.      I have discussed case with Dr. Burrell of Emergency Medicine who would like to admit patient for severe hypertension and elevated cardiac enzymes.      I have personally reviewed her 12 lead ECG. HR is bradycardic but there appears to be P waves before each QRS, no dropped beats. No ST segment changes. T waves appear inverted in V1 and V2.      Assessment and Plan:     69F with PMH HTN, sciatica, is admitted for ACS r/o and concern for UTI, also with /101.  In interviewing the patient, she lacks urinary symptoms and thus cannot diagnose her with UTI. She has received x 1 dose of CTX thus far. Can hold ABX and monitor urine culture. Regarding her HTN, it is severely elevated and there is an associated myocardial injury, so will admit to telemetry and consult cardiology in AM, suspect patient would benefit from a stress test as she states she has never had one.      #Severe Hypertension   #Myocardial Injury    #ACS rule out   #Asymptomatic Pyuria    #?Gastritis        -admit to tele   -trend troponin and ECG   -cardiology consult in AM   -for BP: will start two first line agents, ARB and calcium channel blocker (losartan and nifedipine)    -trial Tylenol and PPI for symptomatic relief   -s/p 1 dose of CTX, monitor urine culture    -continue pregabalin 100 mg BID   -continue sucralfate, home medication        -rest of plan per resident note

## 2025-05-21 NOTE — H&P ADULT - NSHPREVIEWOFSYSTEMS_GEN_ALL_CORE
CONSTITUTIONAL: (+) chills. No weight loss, or fatigue  EYES: No eye pain, visual disturbances, or discharge  ENT:  No difficulty hearing, tinnitus, vertigo; No sinus or throat pain  NECK: No pain or stiffness  RESPIRATORY: No cough, wheezing, or hemoptysis; No Shortness of Breath  CARDIOVASCULAR: No chest pain, palpitations, passing out, dizziness, or leg swelling  GASTROINTESTINAL: (+) abdominal pain. No nausea, vomiting, or hematemesis; No diarrhea or constipation. No melena or hematochezia.  GENITOURINARY: No dysuria, frequency, hematuria, or incontinence  NEUROLOGICAL: No headaches, memory loss, loss of strength, numbness, or tremors  SKIN: No itching, burning, rashes, or lesions   LYMPH Nodes: No enlarged glands  ENDOCRINE: No heat or cold intolerance; No hair loss  MUSCULOSKELETAL: No joint pain or swelling; No muscle, back, No extremity pain  PSYCHIATRIC: No depression, anxiety, mood swings, or difficulty sleeping  HEME/LYMPH: No easy bruising, or bleeding gums  ALLERGY AND IMMUNOLOGIC: No hives or eczema

## 2025-05-21 NOTE — ED PROVIDER NOTE - OBJECTIVE STATEMENT
69-year-old female presents emergency department with complaint of abdominal pain with associated discomfort for several days.  Denies any vomiting or diarrhea however has positive nausea.  Feels diffuse abdominal discomfort.  Also complains of headache with associated generalized weakness.  No frequency or urgency.  She is here with her grandson who is at the bedside.

## 2025-05-21 NOTE — ED ADULT TRIAGE NOTE - TEMPERATURE IN CELSIUS (DEGREES C)
:  Assessment & Plan  Ankle instability, left    Orders:    Ambulatory referral to Physical Therapy; Future    Sinus tarsi syndrome of left foot    Orders:    Ambulatory referral to Physical Therapy; Future    Left foot pain    Orders:    Ambulatory referral to Physical Therapy; Future    The patient's clinical examination today significant for persistent tenderness palpation along the medial ankle and hindfoot.  There is mild tenderness palpation to the area the sinus tarsi.  There are no open lesions.  There is no erythema no edema no calor or ecchymosis.  Pedal pulses are palpable on the left.    MRI images of the patient's left ankle were personally reviewed and interpreted.  I agree with the official report.  Edema is noted within this sinus tarsi consistent with synovitis of the subtalar joint.  There are no signs of peroneal tendinitis or tenosynovitis noted.  Posterior tibial tendon is also abnormal signal.    MRI images were reviewed with the patient in detail.  We discussed the etiology and treatment goals for his sinus tarsi syndrome.  He does have a history of a recent ankle sprain which is the likely culprit.  He would like to continue with conservative care.  He was referred back to physical therapy for strengthening and range of motion exercises.  Injection therapy was noted however he is trying to get his blood sugars under better control so that he can proceed with his spinal surgery.    Recommend follow-up in 4 to 6 weeks.      History of Present Illness     Jc Carmona is a 47 y.o. male   The patient presents today for follow-up status post MRI of the left ankle.  He still notes persistent tenderness along the medial and lateral aspects of his left ankle.  There has been no significant interval changes since his last visit.      PAST MEDICAL HISTORY:  Past Medical History:   Diagnosis Date    Bronchitis     Cancer (HCC)     colon    Colon polyp     Diabetes mellitus (HCC)     GERD  "(gastroesophageal reflux disease)     Hyperlipidemia     Post concussion syndrome 05/09/2017    Pre-op examination 02/15/2023    Sacroiliitis (HCC)     Sleep apnea     \"mild\"    Traumatic brain injury (HCC) 2017    R/S 2017    Weakness of left leg     numbness/tingling    Wears glasses        PAST SURGICAL HISTORY:  Past Surgical History:   Procedure Laterality Date    APPENDECTOMY      CHOLECYSTECTOMY  10/22/2024    COLON SURGERY      COLONOSCOPY      EGD      AL REMOVAL IMPLANT DEEP Left 04/26/2024    Procedure: REMOVAL HARDWARE ANKLE;  Surgeon: Steve Kaplan DPM;  Location: [facility];  Service: Podiatry    AL RPR PRIMARY DISRUPTED LIGAMENT ANKLE COLLATERAL Left 03/10/2023    Procedure: ANKLE LIGAMENT REPAIR OF SYNDESMOTIC LIGAMENT with steroid injection of posterior heel bursa;  Surgeon: Steve Kaplan DPM;  Location: [facility];  Service: Podiatry        ALLERGIES:  Asa [aspirin] and Penicillin g    MEDICATIONS:  Current Outpatient Medications   Medication Sig Dispense Refill    acetaminophen (TYLENOL) 650 mg CR tablet Take 650 mg by mouth 3 (three) times a day as needed      atorvastatin (LIPITOR) 10 mg tablet Take 1 tablet (10 mg total) by mouth every evening 90 tablet 3    cholestyramine (QUESTRAN) 4 g packet Take 1 packet (4 g total) by mouth 3 (three) times a day as needed (DIARRHEA) Take Questran 3 hours apart from other medication 60 packet 3    Continuous Glucose Sensor (FreeStyle Dell 3 Sensor) MISC USE ONE UNIT EVERY 14 DAYS 2 each 5    famotidine (PEPCID) 20 mg tablet Take 20 mg by mouth if needed      Insulin Glargine Solostar (Lantus SoloStar) 100 UNIT/ML SOPN INJECT 15 UNITS (0.15ML) UNDER THE SKIN DAILY AT BEDTIME 24 mL 1    Lancets (OneTouch Delica Plus [id number]) MISC daily      Lidocaine Pain Relief 4 % PTCH if needed      meloxicam (MOBIC) 15 mg tablet Take 1 tablet (15 mg total) by mouth daily 30 tablet 0    metFORMIN (GLUCOPHAGE) 1000 MG tablet TAKE ONE TABLET BY MOUTH TWICE A DAY " WITH MEALS 180 tablet 1    methocarbamol (ROBAXIN) 500 mg tablet Take 500 mg by mouth daily as needed      OneTouch Verio test strip daily      semaglutide, 1 mg/dose, (Ozempic) 4 mg/3 mL injection pen Inject 0.75 mL (1 mg total) under the skin once a week 9 mL 1    tadalafil (CIALIS) 20 MG tablet Take 1 tablet (20 mg total) by mouth daily as needed for erectile dysfunction 30 tablet 5    traMADol (ULTRAM) 50 mg tablet Take 50 mg by mouth every 8 (eight) hours as needed      BD Pen Needle Nancy 2nd Gen 32G X 4 MM MISC USE TWICE DAILY 100 each 2     Current Facility-Administered Medications   Medication Dose Route Frequency Provider Last Rate Last Admin    bupivacaine (MARCAINE) 0.25 % injection 0.5 mL  0.5 mL Injection     0.5 mL at 09/16/24 0800    dexamethasone (DECADRON) injection 2 mg  2 mg Intra-articular     2 mg at 09/16/24 0800       SOCIAL HISTORY:  Social History     Socioeconomic History    Marital status: Single     Spouse name: None    Number of children: None    Years of education: None    Highest education level: None   Occupational History    None   Tobacco Use    Smoking status: Never    Smokeless tobacco: Never   Vaping Use    Vaping status: Never Used   Substance and Sexual Activity    Alcohol use: Not Currently     Alcohol/week: 1.0 standard drink of alcohol     Types: 1 Cans of beer per week    Drug use: Never    Sexual activity: Yes     Partners: Female     Birth control/protection: None   Other Topics Concern    None   Social History Narrative    None     Social Drivers of Health     Financial Resource Strain: Low Risk  (11/2/2024)    Received from [facility]    Overall Financial Resource Strain (CARDIA)     Difficulty of Paying Living Expenses: Not very hard   Food Insecurity: No Food Insecurity (11/2/2024)    Received from [facility]    Hunger Vital Sign     Worried About Running Out of Food in the Last Year: Never true     Ran Out of Food in the Last Year:  "Never true   Transportation Needs: No Transportation Needs (11/2/2024)    Received from [facility]    PRAPARE - Transportation     Lack of Transportation (Medical): No     Lack of Transportation (Non-Medical): No   Physical Activity: Not on file   Stress: Not on file   Social Connections: Not on file   Intimate Partner Violence: Not At Risk (11/2/2024)    [facility] from [facility]    Humiliation, Afraid, Rape, and Kick questionnaire     Fear of Current or Ex-Partner: No     Emotionally Abused: No     Physically Abused: No     Sexually Abused: No   Housing Stability: Low Risk  (11/2/2024)    Received from [facility]    Housing Stability Vital Sign     Unable to Pay for Housing in the Last Year: No     Number of Times Moved in the Last Year: 0     Homeless in the Last Year: No      Review of Systems   Constitutional: Negative.    HENT: Negative.     Eyes: Negative.    Respiratory: Negative.     Cardiovascular: Negative.    Endocrine: Negative.    Musculoskeletal: Negative.    Neurological: Negative.    Hematological: Negative.    Psychiatric/Behavioral: Negative.       Objective   Pulse 94   Ht 5' 5\" (1.651 m)   Wt 94.3 kg (208 lb)   SpO2 97%   BMI 34.61 kg/m²      Physical Exam  Constitutional:       Appearance: Normal appearance.   HENT:      Head: Normocephalic and atraumatic.   Eyes:      Conjunctiva/sclera: Conjunctivae normal.   Cardiovascular:      Pulses:           Dorsalis pedis pulses are 2+ on the left side.        Posterior tibial pulses are 2+ on the left side.   Pulmonary:      Effort: Pulmonary effort is normal.   Feet:      Left foot:      Skin integrity: Skin integrity normal.      Comments: The patient's clinical examination today significant for persistent tenderness palpation along the medial ankle and hindfoot.  There is mild tenderness palpation to the area the sinus tarsi.  There are no open lesions.  There is " no erythema no edema no calor or ecchymosis.  Pedal pulses are palpable on the left.  Skin:     General: Skin is warm and dry.      Capillary Refill: Capillary refill takes less than 2 seconds.   Neurological:      General: No focal deficit present.      Mental Status: He is alert and oriented to person, place, and time.   Psychiatric:         Mood and Affect: Mood normal.            36.3

## 2025-05-21 NOTE — H&P ADULT - NSHPPHYSICALEXAM_GEN_ALL_CORE
T(C): 36.9 (05-21-25 @ 16:52), Max: 36.9 (05-21-25 @ 16:52)  HR: 45 (05-21-25 @ 16:52) (45 - 59)  BP: 190/78 (05-21-25 @ 16:52) (180/101 - 190/78)  RR: 18 (05-21-25 @ 16:52) (17 - 18)  SpO2: 98% (05-21-25 @ 16:52) (98% - 98%)    GENERAL: NAD  HEAD:  Atraumatic, Normocephalic  EYES:  conjunctiva and sclera clear  NECK: Supple  CHEST/LUNG: Clear to auscultation  HEART: Regular rate and rhythm; No murmurs, rubs, or gallops  ABDOMEN: Soft, Nontender, Bowel sounds present, no masses on palpation  NERVOUS SYSTEM:  Alert and oriented x 3  EXTREMITIES:  No BLE edema  SKIN: warm, dry

## 2025-05-21 NOTE — PATIENT PROFILE ADULT - FALL HARM RISK - HARM RISK INTERVENTIONS
Assistance with ambulation/Assistance OOB with selected safe patient handling equipment/Communicate Risk of Fall with Harm to all staff/Discuss with provider need for PT consult/Monitor gait and stability/Provide patient with walking aids - walker, cane, crutches/Reinforce activity limits and safety measures with patient and family/Review medications for side effects contributing to fall risk/Sit up slowly, dangle for a short time, stand at bedside before walking/Tailored Fall Risk Interventions/Toileting schedule using arm’s reach rule for commode and bathroom/Visual Cue: Yellow wristband and red socks/Bed in lowest position, wheels locked, appropriate side rails in place/Call bell, personal items and telephone in reach/Instruct patient to call for assistance before getting out of bed or chair/Non-slip footwear when patient is out of bed/[place] to call system/Physically safe environment - no spills, clutter or unnecessary equipment/Purposeful Proactive Rounding/Room/bathroom lighting operational, light cord in reach

## 2025-05-21 NOTE — ED ADULT TRIAGE NOTE - RESPIRATORY RATE (BREATHS/MIN)
17 74-year-old female past medical history hypertension, hyperlipidemia, CVA, CAD status post stents, vertigo presents for headache with left hand numbness, chest pain, elevated blood pressures.  Chest pain is central described as heaviness or pressure lasting several seconds resolving spontaneously, not associated with diaphoresis nausea or vomiting and not associate with exertion.  Headache occurred later in the day associated with brief mild nausea, transient blurry vision and left hand numbness.  Denies any pain or weakness.  Blood pressure is elevated in ED patient does report adherence with medication but is due to replace her clonidine patch today.  Patient reports scheduled stress test in a couple of weeks with her outpatient cardiologist for follow-up.  Exam as above  Differential diagnosis includes, but not limited to, CVA, ACS, hypertensive urgency, intracranial hemorrhage.  Suspect blood pressure may be secondary to need for new clonidine patch.  Plan Labs, chest x-ray, CT/CTA head and neck, symptom leave, reassess.  We will touch base with cardiologist regarding admitting for stress in hospital today.  Patient with questionable contrast related allergy.  Patient unsure if she has ever had reaction to contrast in the past.  Upon review of catheterization several months ago did receive prednisone prior to catheterization, so we will pretreat for CT

## 2025-05-21 NOTE — ED ADULT NURSE NOTE - NS ED NURSE DISCH DISPOSITION
Admitted Doxepin Counseling:  Patient advised that the medication is sedating and not to drive a car after taking this medication. Patient informed of potential adverse effects including but not limited to dry mouth, urinary retention, and blurry vision.  The patient verbalized understanding of the proper use and possible adverse effects of doxepin.  All of the patient's questions and concerns were addressed.

## 2025-05-21 NOTE — H&P ADULT - HISTORY OF PRESENT ILLNESS
69F with PMH HTN, sciatica, presents for 2 days "gastritis pain," abdominal pain, chills, generalized weakness, fatigue, nausea, and headache. Describes abdominal pain as "fireworks," occurring randomly, not associated with food. Reports history of gastritis and had EGD last year that was normal. Denies vomiting or diarrhea. Denies SOB, wheezing, cough, dysuria, or polyuria. 69F (from home with daughter, uses cane, no HHA) with PMH HTN, sciatica, presents for 2 days "gastritis pain," abdominal pain, chills, generalized weakness, fatigue, nausea, and headache. Describes abdominal pain as "fireworks," occurring randomly, not associated with food. Reports history of gastritis and had EGD last year that was normal. Denies vomiting or diarrhea. Denies SOB, wheezing, cough, dysuria, or polyuria.

## 2025-05-21 NOTE — ED ADULT NURSE NOTE - OBJECTIVE STATEMENT
pt states" im having a gastritis attack" starting Monday. this morning, pt began to feel nausea, headache, chills, weakness. pt pmh: hypertension, hysterectomy, carpal tunnel

## 2025-05-21 NOTE — ED ADULT TRIAGE NOTE - CHIEF COMPLAINT QUOTE
patient complains of abdominal pain / discomfort x couple of days. patient also complains of headache and gen weakness

## 2025-05-21 NOTE — ED PROVIDER NOTE - PROGRESS NOTE DETAILS
ATTG: : elevated bp, low hr, treat with enalprilat. repeat ekg, elevated trop will admit to hospital for further care. noted to have high wbc and le on ua will treat with ceftriaxone.

## 2025-05-21 NOTE — ED ADULT NURSE NOTE - NSFALLRISKINTERV_ED_ALL_ED

## 2025-05-21 NOTE — H&P ADULT - PROBLEM SELECTOR PLAN 1
C/o 2 days of "gastritis pain," abdominal pain, nausea, weakness, chills, nausea  EKG sinus rhythm, no ischemic changes  Troponin 74 in ED  Monitor on tele  S/p  in ED  Start ASA 81mg  F/u repeat troponin; trend to peak C/o 2 days of "gastritis pain," abdominal pain, nausea, weakness, chills, nausea  EKG sinus rhythm, no ischemic changes  Admit for ACS r/o  Troponin 74 in ED  Monitor on tele  S/p  in ED  Start ASA 81mg  F/u repeat troponin; trend to peak  Consult cardiology in AM

## 2025-05-21 NOTE — ED PROVIDER NOTE - PHYSICAL EXAMINATION
Gen.  No acute respiratory distress  HEENT:  EOMI, Pharynx clear  Lungs:  b/l BS, no crackles no wheezing no rhonchi  CVS: S1S2   Abd: soft, diffuse abdominal tenderness, no distention, no guarding, no CVA tenderness  Ext: no edema, no erythema  Neuro: Awake, alert, oriented x 3, no focal deficits  MSK: strength normal in upper and lower ext

## 2025-05-22 DIAGNOSIS — R00.1 BRADYCARDIA, UNSPECIFIED: ICD-10-CM

## 2025-05-22 LAB
ANION GAP SERPL CALC-SCNC: 9 MMOL/L — SIGNIFICANT CHANGE UP (ref 5–17)
BUN SERPL-MCNC: 15 MG/DL — SIGNIFICANT CHANGE UP (ref 7–18)
CALCIUM SERPL-MCNC: 8.6 MG/DL — SIGNIFICANT CHANGE UP (ref 8.4–10.5)
CHLORIDE SERPL-SCNC: 106 MMOL/L — SIGNIFICANT CHANGE UP (ref 96–108)
CO2 SERPL-SCNC: 24 MMOL/L — SIGNIFICANT CHANGE UP (ref 22–31)
CREAT SERPL-MCNC: 0.89 MG/DL — SIGNIFICANT CHANGE UP (ref 0.5–1.3)
CULTURE RESULTS: SIGNIFICANT CHANGE UP
EGFR: 69 ML/MIN/1.73M2 — SIGNIFICANT CHANGE UP
EGFR: 69 ML/MIN/1.73M2 — SIGNIFICANT CHANGE UP
GLUCOSE SERPL-MCNC: 100 MG/DL — HIGH (ref 70–99)
HCT VFR BLD CALC: 42 % — SIGNIFICANT CHANGE UP (ref 34.5–45)
HGB BLD-MCNC: 13.9 G/DL — SIGNIFICANT CHANGE UP (ref 11.5–15.5)
MAGNESIUM SERPL-MCNC: 2.3 MG/DL — SIGNIFICANT CHANGE UP (ref 1.6–2.6)
MCHC RBC-ENTMCNC: 31 PG — SIGNIFICANT CHANGE UP (ref 27–34)
MCHC RBC-ENTMCNC: 33.1 G/DL — SIGNIFICANT CHANGE UP (ref 32–36)
MCV RBC AUTO: 93.8 FL — SIGNIFICANT CHANGE UP (ref 80–100)
NRBC BLD AUTO-RTO: 0 /100 WBCS — SIGNIFICANT CHANGE UP (ref 0–0)
PHOSPHATE SERPL-MCNC: 4.1 MG/DL — SIGNIFICANT CHANGE UP (ref 2.5–4.5)
PLATELET # BLD AUTO: 165 K/UL — SIGNIFICANT CHANGE UP (ref 150–400)
POTASSIUM SERPL-MCNC: 3 MMOL/L — LOW (ref 3.5–5.3)
POTASSIUM SERPL-SCNC: 3 MMOL/L — LOW (ref 3.5–5.3)
RBC # BLD: 4.48 M/UL — SIGNIFICANT CHANGE UP (ref 3.8–5.2)
RBC # FLD: 13.7 % — SIGNIFICANT CHANGE UP (ref 10.3–14.5)
SODIUM SERPL-SCNC: 139 MMOL/L — SIGNIFICANT CHANGE UP (ref 135–145)
SPECIMEN SOURCE: SIGNIFICANT CHANGE UP
TROPONIN I, HIGH SENSITIVITY RESULT: 69.4 NG/L — HIGH
TROPONIN I, HIGH SENSITIVITY RESULT: 76.9 NG/L — HIGH
WBC # BLD: 5.06 K/UL — SIGNIFICANT CHANGE UP (ref 3.8–10.5)
WBC # FLD AUTO: 5.06 K/UL — SIGNIFICANT CHANGE UP (ref 3.8–10.5)

## 2025-05-22 PROCEDURE — 99233 SBSQ HOSP IP/OBS HIGH 50: CPT | Mod: GC

## 2025-05-22 PROCEDURE — 99223 1ST HOSP IP/OBS HIGH 75: CPT

## 2025-05-22 RX ORDER — LOSARTAN POTASSIUM 100 MG/1
25 TABLET, FILM COATED ORAL DAILY
Refills: 0 | Status: DISCONTINUED | OUTPATIENT
Start: 2025-05-23 | End: 2025-05-25

## 2025-05-22 RX ORDER — SENNA 187 MG
2 TABLET ORAL AT BEDTIME
Refills: 0 | Status: DISCONTINUED | OUTPATIENT
Start: 2025-05-22 | End: 2025-05-25

## 2025-05-22 RX ADMIN — Medication 100 MILLIEQUIVALENT(S): at 17:56

## 2025-05-22 RX ADMIN — Medication 100 MILLIEQUIVALENT(S): at 15:08

## 2025-05-22 RX ADMIN — Medication 3 MILLIGRAM(S): at 21:30

## 2025-05-22 RX ADMIN — ROSUVASTATIN CALCIUM 20 MILLIGRAM(S): 20 TABLET, FILM COATED ORAL at 21:30

## 2025-05-22 RX ADMIN — Medication 1 GRAM(S): at 05:55

## 2025-05-22 RX ADMIN — CEFTRIAXONE 100 MILLIGRAM(S): 500 INJECTION, POWDER, FOR SOLUTION INTRAMUSCULAR; INTRAVENOUS at 19:03

## 2025-05-22 RX ADMIN — Medication 40 MILLIEQUIVALENT(S): at 14:44

## 2025-05-22 RX ADMIN — LOSARTAN POTASSIUM 50 MILLIGRAM(S): 100 TABLET, FILM COATED ORAL at 05:54

## 2025-05-22 RX ADMIN — Medication 100 MILLIEQUIVALENT(S): at 16:56

## 2025-05-22 RX ADMIN — Medication 40 MILLIGRAM(S): at 12:16

## 2025-05-22 RX ADMIN — Medication 1 GRAM(S): at 17:11

## 2025-05-22 RX ADMIN — ENOXAPARIN SODIUM 40 MILLIGRAM(S): 100 INJECTION SUBCUTANEOUS at 16:29

## 2025-05-22 RX ADMIN — PREGABALIN 100 MILLIGRAM(S): 50 CAPSULE ORAL at 17:11

## 2025-05-22 RX ADMIN — Medication 100 MILLILITER(S): at 16:56

## 2025-05-22 RX ADMIN — PREGABALIN 100 MILLIGRAM(S): 50 CAPSULE ORAL at 05:56

## 2025-05-22 RX ADMIN — Medication 30 MILLIGRAM(S): at 05:54

## 2025-05-22 NOTE — CONSULT NOTE ADULT - ASSESSMENT
69F (from home with daughter, uses cane, no HHA) with PMH HTN, sciatica, presents for 2 days "gastritis pain," abdominal pain, chills, generalized weakness, fatigue, nausea, and headache. Admitted for ACS, UTI, elevated BPs.  Cardiology was consulted    #Chest pain- patient denies -atypical patient reports abdominal pain   Trops 74.5=76.2=76.9=69.4  ECG-sinus bradycardia    #Bradycardia with multiple PVCs  -patient asymptomatic  -obtain echo for structural abnormalities and EF  -treadmill stress test to evaluate chronotropic incompetence     #Hypertension  -Patient on Losartan 100mg PO at home  -hold for hypotension  -resume gradually once hemodynamically stable    #Hypotension  -likely due to BP meds  -hold all antihypertensive meds, gradually resume once hemodynamically stable    #Hypokalemia  -replete

## 2025-05-22 NOTE — PROGRESS NOTE ADULT - ATTENDING COMMENTS
68 yo F w/PMHx of HTN, sciatica who p/w elevated /101 and subjective sxs of abdominal pain, nausea, no vomiting admitted for possible ACS r/o, BP control and UTI.     On bedside evaluatin this AM feeling ok no N/V but having bandlike abdominal pain and "gastritis pain" though does not specify exaclty how it feels. Not a heartburn does not go up to chest. Denies urinary freq/urge but points to lower abdo for pain. Overnight and this AM found to be sinus yvonne on tele to low 40s.     Exam:  NAD, lying in bed able to speak full sentences   S1 S2 [place] no RMG  Clear to auscultation B/l, normal chest expansion   Abdomen protuberant not distended, soft but tender to palpation of lower quadrant and suprapubic areas   Ext warm and well perfused no edema  Skin no rash or lesion   Neuro alert and orientated x3 no FND    Labs and Imaging reviewed:                       13.9   5.06  )-----------( 165      ( 22 May 2025 06:57 )             42.0   139  |  106  |  15  ----------------------------( 100[H]     05-22 @ 06:57  3.0[L]   |  24  |  0.89    Ca: 8.6   Phos: 4.1   M.3    TPro: x  / Alb: x  /  TBili x  / DBili x  /  AST x  /  ALT x  /  AlkPhos  x     Plan:   #Severe Hypertension   #Myocardial Injury    #ACS rule out   #Asymptomatic Pyuria    #?Gastritis      -monitor for events on tele so far only sinus bradycardia   -troponin peaked 76.9 > 69.4 no need for further repeats   -cardiology following recommend treadmill stress test (no caffeine diet)   -for BP: will start two first line agents, ARB and calcium channel blocker (losartan and nifedipine)    -trial Tylenol and PPI for symptomatic relief   -s/p 1 dose of CTX, monitor urine culture    -continue pregabalin 100 mg BID, oxy 5 q6hr, sucralfate, pepcid, BM reg (high stool burden on CT)  -c/w IVF to help flush out non-obs stone on CT  -dvt ppx lovenox, no need for repeat cbc, c/w BMP replete lytes prn 70 yo F w/PMHx of HTN, sciatica who p/w elevated /101 and subjective sxs of abdominal pain, nausea, no vomiting admitted for possible ACS r/o, BP control and UTI.     On bedside evaluatin this AM feeling ok no N/V but having bandlike abdominal pain and "gastritis pain" though does not specify exaclty how it feels. Not a heartburn does not go up to chest. Denies urinary freq/urge but points to lower abdo for pain. Overnight and this AM found to be sinus yvonne on tele to low 40s.     Exam:  NAD, lying in bed able to speak full sentences   S1 S2 [place] no RMG  Clear to auscultation B/l, normal chest expansion   Abdomen protuberant not distended, soft but tender to palpation of lower quadrant and suprapubic areas   Ext warm and well perfused no edema  Skin no rash or lesion   Neuro alert and orientated x3 no FND    Labs and Imaging reviewed:                       13.9   5.06  )-----------( 165      ( 22 May 2025 06:57 )             42.0   139  |  106  |  15  ----------------------------( 100[H]     05-22 @ 06:57  3.0[L]   |  24  |  0.89    Ca: 8.6   Phos: 4.1   M.3    TPro: x  / Alb: x  /  TBili x  / DBili x  /  AST x  /  ALT x  /  AlkPhos  x     Plan:   #HTN Urgency (on admission now hypotensive)   #Myocardial Injury  #ACS rule out   #?Gastritis      -monitor for events on tele so far only sinus bradycardia   -troponin peaked 76.9 > 69.4 no need for further repeats   -cardiology following recommend treadmill stress test (no caffeine diet)   -for BP was high on admission but can dc ccb as not needed; decrease ARB to 25 with HOLD parameters; currently SBP 90s   -Tylenol and PPI for symptomatic relief   -can complete CTX x3d; +UA and some suprapubic discomfort on exam, monitor urine culture    -continue pregabalin 100 mg BID, oxy 5 q6hr, sucralfate, pepcid, BM reg (high stool burden on CT)  -c/w IVF for low BP and to help flush out non-obs stone on CT  -dvt ppx lovenox, no need for repeat cbc, c/w BMP replete lytes prn

## 2025-05-22 NOTE — PROGRESS NOTE ADULT - PROBLEM SELECTOR PLAN 5
continue home med pregabalin continue home med pregabalin  Tramadol at home, c/w as oxycodone here for severe pain prn c/o chills and abdominal pain, UA+  s/p CTX and 1L NS in ED  continue CTX  f/u urine cx and sensitivity

## 2025-05-22 NOTE — PROGRESS NOTE ADULT - PROBLEM SELECTOR PLAN 7
lovenox DVT ppx: lovenox reports hx of gastritis but EGD last year that was normal  continue home meds famotidine, sucralfate

## 2025-05-22 NOTE — PROGRESS NOTE ADULT - PROBLEM SELECTOR PLAN 6
reports hx of gastritis but EGD last year that was normal  continue home meds famotidine, sucralfate continue home med pregabalin  Tramadol at home, c/w as oxycodone here for severe pain prn

## 2025-05-22 NOTE — PHARMACOTHERAPY INTERVENTION NOTE - COMMENTS
Patient identified by [facility] for Patients 62 y/o and Older Report.     Oxycodone PRN    No intervention at this time.

## 2025-05-22 NOTE — PROGRESS NOTE ADULT - PROBLEM SELECTOR PLAN 4
c/o chills and abdominal pain  s/p CTX and 1L NS in ED  continue CTX  f/u urine cx and sensitivity  f/u blood cx Sinus yvonne on tele, 40s-50s during the day, 30s-40s at night  Per patient, at baseline    - Cardiology Dr. Mckenzie following  - plan for non-nuclear treadmill stress test tmr

## 2025-05-22 NOTE — CONSULT NOTE ADULT - SUBJECTIVE AND OBJECTIVE BOX
CHIEF COMPLAINT:    HPI: 69F (from home with daughter, uses cane, no HHA) with PMH HTN, sciatica, presents for 2 days "gastritis pain," abdominal pain, chills, generalized weakness, fatigue, nausea, and headache. Describes abdominal pain occurring randomly, not associated with food. Reports history of gastritis and had EGD last year that was normal. Patient denies chest pain, palpitations, syncope, shortness of breath, LE edema, PND/orthopnea.       PAST MEDICAL & SURGICAL HISTORY:  HTN (hypertension)      Chronic back pain      Sciatica      History of gastritis      History of bilateral hip replacements      S/P hysterectomy      Finding of knee region  b/l knee surgery          Allergies    erythromycin (Swelling)    Intolerances        MEDICATIONS  (STANDING):  cefTRIAXone   IVPB 1000 milliGRAM(s) IV Intermittent every 24 hours  enoxaparin Injectable 40 milliGRAM(s) SubCutaneous every 24 hours  famotidine    Tablet 40 milliGRAM(s) Oral daily  losartan 25 milliGRAM(s) Oral daily  melatonin 3 milliGRAM(s) Oral at bedtime  polyethylene glycol 3350 17 Gram(s) Oral daily  pregabalin 100 milliGRAM(s) Oral two times a day  rosuvastatin 20 milliGRAM(s) Oral at bedtime  senna 2 Tablet(s) Oral at bedtime  sucralfate 1 Gram(s) Oral two times a day    MEDICATIONS  (PRN):  oxyCODONE    IR 5 milliGRAM(s) Oral every 6 hours PRN Severe Pain (7 - 10)      FAMILY HISTORY:  mother-angina    ***No family history of premature coronary artery disease or sudden cardiac death    SOCIAL HISTORY:  Smoking-denies  Alcohol-denies  Illicit Drug use-denies    REVIEW OF SYSTEMS:  Constitutional: [ ] fever, [ ]weight loss,  [x ]fatigue  Eyes: [ ] visual changes  Respiratory: [ ]shortness of breath;  [ ] cough, [ ]wheezing, [ ]chills, [ ]hemoptysis  Cardiovascular: [ ] chest pain, [ ]palpitations, [ ]dizziness,  [ ]leg swelling [ ]syncope  Gastrointestinal: [x ] abdominal pain, [ x]nausea, [ ]vomiting,  [ ]diarrhea   Genitourinary: [ ] dysuria, [ ] hematuria  Neurologic: [x ] headaches [ ] tremors  [ ] weakness [ ] lightheadedness  Skin: [ ] itching, [ ]burning, [ ] rashes  Endocrine: [ ] heat or cold intolerance  Musculoskeletal: [ ] joint pain or swelling; [ ] muscle, back, or extremity pain  Psychiatric: [ ] depression, [ ]anxiety, [ ]mood swings, or [ ]difficulty sleeping  Hematologic: [ ] easy bruising, [ ] bleeding gums     [ x] All others negative	  [ ] Unable to obtain    Vital Signs Last 24 Hrs  T(C): 36.2 (22 May 2025 10:27), Max: 36.9 (21 May 2025 16:52)  T(F): 97.1 (22 May 2025 10:27), Max: 98.4 (21 May 2025 16:52)  HR: 42 (22 May 2025 10:27) (41 - 59)  BP: 91/52 (22 May 2025 10:27) (82/47 - 190/78)  BP(mean): 65 (22 May 2025 10:27) (65 - 65)  RR: 18 (22 May 2025 10:27) (17 - 18)  SpO2: 96% (22 May 2025 10:27) (94% - 99%)    Parameters below as of 22 May 2025 10:26  Patient On (Oxygen Delivery Method): room air      I&O's Summary      PHYSICAL EXAM:  General: No acute distress  HEENT: EOMI  Neck:  No JVD  Lungs: Clear to auscultation bilaterally; No rales or wheezing  Heart: Regular rate and rhythm; No murmurs, rubs, or gallops  Abdomen: soft, non tender, non distended   Extremities: warm, no edema   Nervous system:  Alert & Oriented X3  Psychiatric: Normal affect  Skin: No rashes or lesions    LABS:  05-22    139  |  106  |  15  ----------------------------<  100[H]  3.0[L]   |  24  |  0.89    Ca    8.6      22 May 2025 06:57  Phos  4.1     05-22  Mg     2.3     05-22    TPro  6.9  /  Alb  3.6  /  TBili  0.8  /  DBili  x   /  AST  31  /  ALT  42  /  AlkPhos  89  05-21    Creatinine Trend: 0.89<--, 0.77<--                        13.9   5.06  )-----------( 165      ( 22 May 2025 06:57 )             42.0         Lipid Panel:   Cardiac Enzymes:    Trops 74.5=76.2=76.9=69.4        RADIOLOGY:< from: CT Abdomen and Pelvis w/ IV Cont (05.21.25 @ 18:43) >  IMPRESSION:  1. No evidence of colitis for gastroenteritis.  2. There is a moderate to large amount stool throughout the colon.  3. Punctate nonobstructing left renal calculus.  4. Cholelithiasis.  5. Extensive degenerative changes of the spine with marked central canal   narrowing at L3-L4.    --- End of Report ---        < end of copied text >      ECG [my interpretation]: Sinus bradycardia HR 54    TELEMETRY: NSR with PVCs HR range 40-60     CHIEF COMPLAINT: abdominal pain    HPI: 69F (from home with daughter, uses cane, no HHA) with PMH HTN, sciatica, presents for 2 days "gastritis pain," abdominal pain, chills, generalized weakness, fatigue, nausea, and headache. Describes abdominal pain occurring randomly, not associated with food. Reports history of gastritis and had EGD last year that was normal. Patient denies chest pain, palpitations, syncope, shortness of breath, LE edema, PND/orthopnea.       PAST MEDICAL & SURGICAL HISTORY:  HTN (hypertension)      Chronic back pain      Sciatica      History of gastritis      History of bilateral hip replacements      S/P hysterectomy      Finding of knee region  b/l knee surgery          Allergies    erythromycin (Swelling)    Intolerances        MEDICATIONS  (STANDING):  cefTRIAXone   IVPB 1000 milliGRAM(s) IV Intermittent every 24 hours  enoxaparin Injectable 40 milliGRAM(s) SubCutaneous every 24 hours  famotidine    Tablet 40 milliGRAM(s) Oral daily  losartan 25 milliGRAM(s) Oral daily  melatonin 3 milliGRAM(s) Oral at bedtime  polyethylene glycol 3350 17 Gram(s) Oral daily  pregabalin 100 milliGRAM(s) Oral two times a day  rosuvastatin 20 milliGRAM(s) Oral at bedtime  senna 2 Tablet(s) Oral at bedtime  sucralfate 1 Gram(s) Oral two times a day    MEDICATIONS  (PRN):  oxyCODONE    IR 5 milliGRAM(s) Oral every 6 hours PRN Severe Pain (7 - 10)      FAMILY HISTORY:  mother-angina    ***No family history of premature coronary artery disease or sudden cardiac death    SOCIAL HISTORY:  Smoking-denies  Alcohol-denies  Illicit Drug use-denies    REVIEW OF SYSTEMS:  Constitutional: [ ] fever, [ ]weight loss,  [x ]fatigue  Eyes: [ ] visual changes  Respiratory: [ ]shortness of breath;  [ ] cough, [ ]wheezing, [ ]chills, [ ]hemoptysis  Cardiovascular: [ ] chest pain, [ ]palpitations, [ ]dizziness,  [ ]leg swelling [ ]syncope  Gastrointestinal: [x ] abdominal pain, [ x]nausea, [ ]vomiting,  [ ]diarrhea   Genitourinary: [ ] dysuria, [ ] hematuria  Neurologic: [x ] headaches [ ] tremors  [ ] weakness [ ] lightheadedness  Skin: [ ] itching, [ ]burning, [ ] rashes  Endocrine: [ ] heat or cold intolerance  Musculoskeletal: [ ] joint pain or swelling; [ ] muscle, back, or extremity pain  Psychiatric: [ ] depression, [ ]anxiety, [ ]mood swings, or [ ]difficulty sleeping  Hematologic: [ ] easy bruising, [ ] bleeding gums     [ x] All others negative	  [ ] Unable to obtain    Vital Signs Last 24 Hrs  T(C): 36.2 (22 May 2025 10:27), Max: 36.9 (21 May 2025 16:52)  T(F): 97.1 (22 May 2025 10:27), Max: 98.4 (21 May 2025 16:52)  HR: 42 (22 May 2025 10:27) (41 - 59)  BP: 91/52 (22 May 2025 10:27) (82/47 - 190/78)  BP(mean): 65 (22 May 2025 10:27) (65 - 65)  RR: 18 (22 May 2025 10:27) (17 - 18)  SpO2: 96% (22 May 2025 10:27) (94% - 99%)    Parameters below as of 22 May 2025 10:26  Patient On (Oxygen Delivery Method): room air      I&O's Summary      PHYSICAL EXAM:  General: No acute distress  HEENT: EOMI  Neck:  No JVD  Lungs: Clear to auscultation bilaterally; No rales or wheezing  Heart: Regular rate and rhythm; No murmurs, rubs, or gallops  Abdomen: soft, non tender, non distended   Extremities: warm, no edema   Nervous system:  Alert & Oriented X3  Psychiatric: Normal affect  Skin: No rashes or lesions    LABS:  05-22    139  |  106  |  15  ----------------------------<  100[H]  3.0[L]   |  24  |  0.89    Ca    8.6      22 May 2025 06:57  Phos  4.1     05-22  Mg     2.3     05-22    TPro  6.9  /  Alb  3.6  /  TBili  0.8  /  DBili  x   /  AST  31  /  ALT  42  /  AlkPhos  89  05-21    Creatinine Trend: 0.89<--, 0.77<--                        13.9   5.06  )-----------( 165      ( 22 May 2025 06:57 )             42.0         Lipid Panel:   Cardiac Enzymes:    Trops 74.5=76.2=76.9=69.4        RADIOLOGY:< from: CT Abdomen and Pelvis w/ IV Cont (05.21.25 @ 18:43) >  IMPRESSION:  1. No evidence of colitis for gastroenteritis.  2. There is a moderate to large amount stool throughout the colon.  3. Punctate nonobstructing left renal calculus.  4. Cholelithiasis.  5. Extensive degenerative changes of the spine with marked central canal   narrowing at L3-L4.    --- End of Report ---        < end of copied text >      ECG [my interpretation]: Sinus bradycardia HR 54    TELEMETRY: NSR with PVCs HR range 40-60

## 2025-05-22 NOTE — CONSULT NOTE ADULT - NS ATTEND AMEND GEN_ALL_CORE FT
Agree w above w following additions/modifications    69F w HTN p/w "gastritis" to ER, found to have elevated BP and mildly elevated cardiac enzymes. Cardiology consulted for atypical chest pain.  On interview pt denies chest pain. Reports abdominal discomfort as the cause of her presentation. It has since improved. Pt reports her BP at home is generally well controlled but does go up when she's not feeling well. She is active, denies palpitations/syncope.   Pt was given anti-HTN meds on admission. This AM SBP dipped to 80s. Pt also started on Abx for UTI.     ECG 5/21/25 sinus bradycardia, septal qs, possible LVH  tele sinus bradycardia w PVCs  trop 74-->76-->69    On exam NAD, No JVD, RRR no m/r/g, CTAB, no w/r/r    Pt denies cp. Her ECG is grossly unchanged from 2024. She is bradycardiac w PVCs.   TTE   ETT  hold antihypertensives

## 2025-05-22 NOTE — PROGRESS NOTE ADULT - PROBLEM SELECTOR PLAN 3
/101 in ED  Pt was previously on losartan at home  s/p enalaprilat 1.25 IV in ED  Start stat losartan 50 QD PO  Start stat nifedipine 30 QD PO /101 in ED  Pt was previously on losartan 100mg at home  s/p enalaprilat 1.25 IV in ED  Started on losartan 50mg qd, nifedipine 30mg qd yesterday, however, hypotensive this AM (SBP 80s-90s)  D/c nifedipine  Decrease losartan to 25mg tomorrow AM

## 2025-05-22 NOTE — PROGRESS NOTE ADULT - PROBLEM SELECTOR PLAN 1
C/o 2 days of "gastritis pain," abdominal pain, nausea, weakness, chills, nausea  EKG sinus rhythm, no ischemic changes  Admit for ACS r/o  Troponin 74 in ED, peaked at 76  Monitor on tele  S/p  in ED  C/w ASA 81mg  Cardiology Dr. Mckenzie following C/o 2 days of "gastritis pain," abdominal pain, nausea, weakness, chills, nausea  EKG sinus rhythm, no ischemic changes  Admit for ACS r/o  Troponin 74 in ED, peaked at 76  Monitor on tele  S/p  in ED  C/w ASA 81mg  Cardiology Dr. Mckenzie following  F/u TTE

## 2025-05-22 NOTE — PROGRESS NOTE ADULT - SUBJECTIVE AND OBJECTIVE BOX
PGY-1 Progress Note discussed with attending    PLEASE MS TEAMS AUTHOR TILL 5:00 PM    PLEASE CONTACT ON CALL TEAM:  - On Call Team (Please refer to Dale) FROM 5:00 PM - 8:30PM  - Nightfloat Team FROM 8:30 -7:30 AM      INTERVAL HPI/OVERNIGHT EVENTS: No acute events overnight.    SUBJECTIVE: Patient seen and examined at bedside this morning. ***INCOMPLETE***    ---------------------------    REVIEW OF SYSTEMS:  CONSTITUTIONAL: No fever, weight loss, fatigue  RESPIRATORY: No cough, wheezing, chills, hemoptysis, shortness of breath  CARDIOVASCULAR: No chest pain, palpitations, dizziness, leg swelling  GASTROINTESTINAL: No abdominal pain, nausea, vomiting, hematemesis, diarrhea, constipation, melena, hematochezia  GENITOURINARY: No dysuria, hematuria, urinary frequency  NEUROLOGICAL: No headaches, memory loss, loss of strength, numbness, tremors  SKIN: No itching, burning, rashes, lesions     MEDICATIONS  (STANDING):  cefTRIAXone   IVPB 1000 milliGRAM(s) IV Intermittent every 24 hours  enoxaparin Injectable 40 milliGRAM(s) SubCutaneous every 24 hours  famotidine    Tablet 40 milliGRAM(s) Oral daily  losartan 25 milliGRAM(s) Oral daily  melatonin 3 milliGRAM(s) Oral at bedtime  polyethylene glycol 3350 17 Gram(s) Oral daily  pregabalin 100 milliGRAM(s) Oral two times a day  rosuvastatin 20 milliGRAM(s) Oral at bedtime  senna 2 Tablet(s) Oral at bedtime  sucralfate 1 Gram(s) Oral two times a day    MEDICATIONS  (PRN):  oxyCODONE    IR 5 milliGRAM(s) Oral every 6 hours PRN Severe Pain (7 - 10)      Vital Signs Last 24 Hrs  T(C): 36.2 (22 May 2025 10:27), Max: 36.9 (21 May 2025 16:52)  T(F): 97.1 (22 May 2025 10:27), Max: 98.4 (21 May 2025 16:52)  HR: 42 (22 May 2025 10:27) (41 - 59)  BP: 91/52 (22 May 2025 10:27) (82/47 - 190/78)  BP(mean): 65 (22 May 2025 10:27) (65 - 65)  RR: 18 (22 May 2025 10:27) (17 - 18)  SpO2: 96% (22 May 2025 10:27) (94% - 99%)    Parameters below as of 22 May 2025 10:26  Patient On (Oxygen Delivery Method): room air        -----------------------------    PHYSICAL EXAMINATION:  GENERAL: NAD, lying in bed  HEAD:  Atraumatic, Normocephalic  EYES:  conjunctiva and sclera clear  NECK: Supple, No JVD  CHEST/LUNG: Clear to auscultation bilaterally; No rales, rhonchi, wheezing, or rubs  HEART: Regular rate and rhythm; No murmurs, rubs, or gallops  ABDOMEN: Soft, Nontender, Nondistended; Bowel sounds present, no guarding  NERVOUS SYSTEM:  Alert & Oriented X3  : voiding well  EXTREMITIES:  2+ Peripheral Pulses, No clubbing, cyanosis, or edema  SKIN: warm dry                          13.9   5.06  )-----------( 165      ( 22 May 2025 06:57 )             42.0     05-22    139  |  106  |  15  ----------------------------<  100[H]  3.0[L]   |  24  |  0.89    Ca    8.6      22 May 2025 06:57  Phos  4.1     05-22  Mg     2.3     05-22    TPro  6.9  /  Alb  3.6  /  TBili  0.8  /  DBili  x   /  AST  31  /  ALT  42  /  AlkPhos  89  05-21    LIVER FUNCTIONS - ( 21 May 2025 14:31 )  Alb: 3.6 g/dL / Pro: 6.9 g/dL / ALK PHOS: 89 U/L / ALT: 42 U/L DA / AST: 31 U/L / GGT: x                   I&O's Summary          CAPILLARY BLOOD GLUCOSE      RADIOLOGY & ADDITIONAL TESTS:                   PGY-1 Progress Note discussed with attending    PLEASE MS TEAMS AUTHOR TILL 5:00 PM    PLEASE CONTACT ON CALL TEAM:  - On Call Team (Please refer to Dale) FROM 5:00 PM - 8:30PM  - Nightfloat Team FROM 8:30 -7:30 AM      INTERVAL HPI/OVERNIGHT EVENTS: No acute events overnight. HR in 30s-40s overnight, sinus bradycardia on tele, patient sleeping at the time.    SUBJECTIVE: Patient seen and examined at bedside this morning. Still having some abdominal discomfort, but states that it typically happens after a meal. Denies any chest pain, palpitations.    ---------------------------    REVIEW OF SYSTEMS:  CONSTITUTIONAL: No fever, weight loss, fatigue  RESPIRATORY: No cough, wheezing, chills, hemoptysis, shortness of breath  CARDIOVASCULAR: No chest pain, palpitations, dizziness, leg swelling  GASTROINTESTINAL: No abdominal pain, nausea, vomiting, hematemesis, diarrhea, constipation, melena, hematochezia  GENITOURINARY: No dysuria, hematuria, urinary frequency  NEUROLOGICAL: No headaches, memory loss, loss of strength, numbness, tremors  SKIN: No itching, burning, rashes, lesions     MEDICATIONS  (STANDING):  cefTRIAXone   IVPB 1000 milliGRAM(s) IV Intermittent every 24 hours  enoxaparin Injectable 40 milliGRAM(s) SubCutaneous every 24 hours  famotidine    Tablet 40 milliGRAM(s) Oral daily  losartan 25 milliGRAM(s) Oral daily  melatonin 3 milliGRAM(s) Oral at bedtime  polyethylene glycol 3350 17 Gram(s) Oral daily  pregabalin 100 milliGRAM(s) Oral two times a day  rosuvastatin 20 milliGRAM(s) Oral at bedtime  senna 2 Tablet(s) Oral at bedtime  sucralfate 1 Gram(s) Oral two times a day    MEDICATIONS  (PRN):  oxyCODONE    IR 5 milliGRAM(s) Oral every 6 hours PRN Severe Pain (7 - 10)      Vital Signs Last 24 Hrs  T(C): 36.2 (22 May 2025 10:27), Max: 36.9 (21 May 2025 16:52)  T(F): 97.1 (22 May 2025 10:27), Max: 98.4 (21 May 2025 16:52)  HR: 42 (22 May 2025 10:27) (41 - 59)  BP: 91/52 (22 May 2025 10:27) (82/47 - 190/78)  BP(mean): 65 (22 May 2025 10:27) (65 - 65)  RR: 18 (22 May 2025 10:27) (17 - 18)  SpO2: 96% (22 May 2025 10:27) (94% - 99%)    Parameters below as of 22 May 2025 10:26  Patient On (Oxygen Delivery Method): room air        -----------------------------    PHYSICAL EXAMINATION:  GENERAL: NAD, lying in bed  HEAD:  Atraumatic, Normocephalic  EYES:  conjunctiva and sclera clear  NECK: Supple, No JVD  CHEST/LUNG: Clear to auscultation bilaterally; No rales, rhonchi, wheezing, or rubs  HEART: Regular rate and rhythm; No murmurs, rubs, or gallops  ABDOMEN: Soft, Nontender, Nondistended; Bowel sounds present, no guarding  NERVOUS SYSTEM:  Alert & Oriented X3  : voiding well  EXTREMITIES:  2+ Peripheral Pulses, No clubbing, cyanosis, or edema  SKIN: warm dry                          13.9   5.06  )-----------( 165      ( 22 May 2025 06:57 )             42.0     05-22    139  |  106  |  15  ----------------------------<  100[H]  3.0[L]   |  24  |  0.89    Ca    8.6      22 May 2025 06:57  Phos  4.1     05-22  Mg     2.3     05-22    TPro  6.9  /  Alb  3.6  /  TBili  0.8  /  DBili  x   /  AST  31  /  ALT  42  /  AlkPhos  89  05-21    LIVER FUNCTIONS - ( 21 May 2025 14:31 )  Alb: 3.6 g/dL / Pro: 6.9 g/dL / ALK PHOS: 89 U/L / ALT: 42 U/L DA / AST: 31 U/L / GGT: x                   I&O's Summary          CAPILLARY BLOOD GLUCOSE      RADIOLOGY & ADDITIONAL TESTS:

## 2025-05-23 LAB
ANION GAP SERPL CALC-SCNC: 3 MMOL/L — LOW (ref 5–17)
BILIRUB SERPL-MCNC: 0.3 MG/DL — SIGNIFICANT CHANGE UP (ref 0.2–1.2)
BUN SERPL-MCNC: 15 MG/DL — SIGNIFICANT CHANGE UP (ref 7–18)
CALCIUM SERPL-MCNC: 7.9 MG/DL — LOW (ref 8.4–10.5)
CHLORIDE SERPL-SCNC: 112 MMOL/L — HIGH (ref 96–108)
CHOLEST SERPL-MCNC: 150 MG/DL — SIGNIFICANT CHANGE UP
CO2 SERPL-SCNC: 28 MMOL/L — SIGNIFICANT CHANGE UP (ref 22–31)
CREAT SERPL-MCNC: 0.66 MG/DL — SIGNIFICANT CHANGE UP (ref 0.5–1.3)
EGFR: 94 ML/MIN/1.73M2 — SIGNIFICANT CHANGE UP
EGFR: 94 ML/MIN/1.73M2 — SIGNIFICANT CHANGE UP
FLUAV AG NPH QL: SIGNIFICANT CHANGE UP
FLUBV AG NPH QL: SIGNIFICANT CHANGE UP
GLUCOSE SERPL-MCNC: 118 MG/DL — HIGH (ref 70–99)
HDLC SERPL-MCNC: 44 MG/DL — LOW
INR BLD: 1 RATIO — SIGNIFICANT CHANGE UP (ref 0.85–1.16)
LDLC SERPL-MCNC: 81 MG/DL — SIGNIFICANT CHANGE UP
LIPID PNL WITH DIRECT LDL SERPL: 81 MG/DL — SIGNIFICANT CHANGE UP
MAGNESIUM SERPL-MCNC: 2.1 MG/DL — SIGNIFICANT CHANGE UP (ref 1.6–2.6)
MELD SCORE WITH DIALYSIS: 20 POINTS — SIGNIFICANT CHANGE UP
MELD SCORE WITHOUT DIALYSIS: 6 POINTS — SIGNIFICANT CHANGE UP
NONHDLC SERPL-MCNC: 106 MG/DL — SIGNIFICANT CHANGE UP
PHOSPHATE SERPL-MCNC: 2.9 MG/DL — SIGNIFICANT CHANGE UP (ref 2.5–4.5)
POTASSIUM SERPL-MCNC: 3.9 MMOL/L — SIGNIFICANT CHANGE UP (ref 3.5–5.3)
POTASSIUM SERPL-SCNC: 3.9 MMOL/L — SIGNIFICANT CHANGE UP (ref 3.5–5.3)
PROTHROM AB SERPL-ACNC: 11.6 SEC — SIGNIFICANT CHANGE UP (ref 9.9–13.4)
RSV RNA NPH QL NAA+NON-PROBE: SIGNIFICANT CHANGE UP
SARS-COV-2 RNA SPEC QL NAA+PROBE: DETECTED
SODIUM SERPL-SCNC: 143 MMOL/L — SIGNIFICANT CHANGE UP (ref 135–145)
SOURCE RESPIRATORY: SIGNIFICANT CHANGE UP
TRIGL SERPL-MCNC: 141 MG/DL — SIGNIFICANT CHANGE UP
TSH SERPL-MCNC: 1.14 UU/ML — SIGNIFICANT CHANGE UP (ref 0.34–4.82)

## 2025-05-23 PROCEDURE — 99232 SBSQ HOSP IP/OBS MODERATE 35: CPT

## 2025-05-23 PROCEDURE — 99233 SBSQ HOSP IP/OBS HIGH 50: CPT | Mod: GC

## 2025-05-23 PROCEDURE — 93351 STRESS TTE COMPLETE: CPT | Mod: 26

## 2025-05-23 PROCEDURE — 93306 TTE W/DOPPLER COMPLETE: CPT | Mod: 26

## 2025-05-23 RX ADMIN — Medication 1 GRAM(S): at 05:45

## 2025-05-23 RX ADMIN — LOSARTAN POTASSIUM 25 MILLIGRAM(S): 100 TABLET, FILM COATED ORAL at 05:45

## 2025-05-23 RX ADMIN — PREGABALIN 100 MILLIGRAM(S): 50 CAPSULE ORAL at 17:31

## 2025-05-23 RX ADMIN — PREGABALIN 100 MILLIGRAM(S): 50 CAPSULE ORAL at 05:45

## 2025-05-23 RX ADMIN — Medication 3 MILLIGRAM(S): at 21:08

## 2025-05-23 RX ADMIN — ENOXAPARIN SODIUM 40 MILLIGRAM(S): 100 INJECTION SUBCUTANEOUS at 17:32

## 2025-05-23 RX ADMIN — OXYCODONE HYDROCHLORIDE 5 MILLIGRAM(S): 30 TABLET ORAL at 22:09

## 2025-05-23 RX ADMIN — OXYCODONE HYDROCHLORIDE 5 MILLIGRAM(S): 30 TABLET ORAL at 21:09

## 2025-05-23 RX ADMIN — OXYCODONE HYDROCHLORIDE 5 MILLIGRAM(S): 30 TABLET ORAL at 09:21

## 2025-05-23 RX ADMIN — Medication 1 GRAM(S): at 17:32

## 2025-05-23 RX ADMIN — Medication 40 MILLIGRAM(S): at 12:09

## 2025-05-23 RX ADMIN — OXYCODONE HYDROCHLORIDE 5 MILLIGRAM(S): 30 TABLET ORAL at 00:36

## 2025-05-23 RX ADMIN — CEFTRIAXONE 100 MILLIGRAM(S): 500 INJECTION, POWDER, FOR SOLUTION INTRAMUSCULAR; INTRAVENOUS at 18:54

## 2025-05-23 RX ADMIN — OXYCODONE HYDROCHLORIDE 5 MILLIGRAM(S): 30 TABLET ORAL at 08:44

## 2025-05-23 RX ADMIN — ROSUVASTATIN CALCIUM 20 MILLIGRAM(S): 20 TABLET, FILM COATED ORAL at 21:08

## 2025-05-23 NOTE — PROGRESS NOTE ADULT - SUBJECTIVE AND OBJECTIVE BOX
PGY-1 Progress Note discussed with attending    PLEASE MS TEAMS AUTHOR TILL 5:00 PM    PLEASE CONTACT ON CALL TEAM:  - On Call Team (Please refer to Dale) FROM 5:00 PM - 8:30PM  - Nightfloat Team FROM 8:30 -7:30 AM      INTERVAL HPI/OVERNIGHT EVENTS: No acute events overnight.    SUBJECTIVE: Patient seen and examined at bedside this morning. ***INCOMPLETE***    ---------------------------    REVIEW OF SYSTEMS:  CONSTITUTIONAL: No fever, weight loss, fatigue  RESPIRATORY: No cough, wheezing, chills, hemoptysis, shortness of breath  CARDIOVASCULAR: No chest pain, palpitations, dizziness, leg swelling  GASTROINTESTINAL: No abdominal pain, nausea, vomiting, hematemesis, diarrhea, constipation, melena, hematochezia  GENITOURINARY: No dysuria, hematuria, urinary frequency  NEUROLOGICAL: No headaches, memory loss, loss of strength, numbness, tremors  SKIN: No itching, burning, rashes, lesions     MEDICATIONS  (STANDING):  cefTRIAXone   IVPB 1000 milliGRAM(s) IV Intermittent every 24 hours  enoxaparin Injectable 40 milliGRAM(s) SubCutaneous every 24 hours  famotidine    Tablet 40 milliGRAM(s) Oral daily  losartan 25 milliGRAM(s) Oral daily  melatonin 3 milliGRAM(s) Oral at bedtime  polyethylene glycol 3350 17 Gram(s) Oral daily  pregabalin 100 milliGRAM(s) Oral two times a day  rosuvastatin 20 milliGRAM(s) Oral at bedtime  senna 2 Tablet(s) Oral at bedtime  sodium chloride 0.9%. 1000 milliLiter(s) (100 mL/Hr) IV Continuous <Continuous>  sucralfate 1 Gram(s) Oral two times a day    MEDICATIONS  (PRN):  oxyCODONE    IR 5 milliGRAM(s) Oral every 6 hours PRN Severe Pain (7 - 10)      Vital Signs Last 24 Hrs  T(C): 36.5 (23 May 2025 14:51), Max: 36.8 (22 May 2025 20:22)  T(F): 97.7 (23 May 2025 14:51), Max: 98.2 (22 May 2025 20:22)  HR: 50 (23 May 2025 14:51) (47 - 59)  BP: 134/76 (23 May 2025 14:51) (115/71 - 134/76)  BP(mean): 85 (23 May 2025 04:50) (77 - 90)  RR: 18 (23 May 2025 14:51) (17 - 18)  SpO2: 96% (23 May 2025 14:51) (95% - 97%)    Parameters below as of 23 May 2025 14:51  Patient On (Oxygen Delivery Method): room air        -----------------------------    PHYSICAL EXAMINATION:  GENERAL: NAD, lying in bed  HEAD:  Atraumatic, Normocephalic  EYES:  conjunctiva and sclera clear  NECK: Supple, No JVD  CHEST/LUNG: Clear to auscultation bilaterally; No rales, rhonchi, wheezing, or rubs  HEART: Regular rate and rhythm; No murmurs, rubs, or gallops  ABDOMEN: Soft, Nontender, Nondistended; Bowel sounds present, no guarding  NERVOUS SYSTEM:  Alert & Oriented X3  : voiding well  EXTREMITIES:  2+ Peripheral Pulses, No clubbing, cyanosis, or edema  SKIN: warm dry                          13.9   5.06  )-----------( 165      ( 22 May 2025 06:57 )             42.0     05-23    143  |  112[H]  |  15  ----------------------------<  118[H]  3.9   |  28  |  0.66    Ca    7.9[L]      23 May 2025 06:50  Phos  2.9     05-23  Mg     2.1     05-23    TPro  x   /  Alb  x   /  TBili  0.3  /  DBili  x   /  AST  x   /  ALT  x   /  AlkPhos  x   05-23          PT/INR - ( 23 May 2025 06:50 )   PT: 11.6 sec;   INR: 1.00 ratio             I&O's Summary        Culture - Urine (collected 21 May 2025 16:51)  Source: Clean Catch Clean Catch (Midstream)  Final Report (22 May 2025 17:50):    <10,000 CFU/mL Normal Urogenital Sugar        CAPILLARY BLOOD GLUCOSE      RADIOLOGY & ADDITIONAL TESTS:                   PGY-1 Progress Note discussed with attending    PLEASE MS TEAMS AUTHOR TILL 5:00 PM    PLEASE CONTACT ON CALL TEAM:  - On Call Team (Please refer to Dale) FROM 5:00 PM - 8:30PM  - Nightfloat Team FROM 8:30 -7:30 AM      INTERVAL HPI/OVERNIGHT EVENTS: No acute events overnight.    SUBJECTIVE: Patient seen and examined at bedside this morning. No complaints today, denies any chest pain, palpitations, dizziness.    ---------------------------    REVIEW OF SYSTEMS:  CONSTITUTIONAL: No fever, weight loss, fatigue  RESPIRATORY: No cough, wheezing, chills, hemoptysis, shortness of breath  CARDIOVASCULAR: No chest pain, palpitations, dizziness, leg swelling  GASTROINTESTINAL: No abdominal pain, nausea, vomiting, hematemesis, diarrhea, constipation, melena, hematochezia  GENITOURINARY: No dysuria, hematuria, urinary frequency  NEUROLOGICAL: No headaches, memory loss, loss of strength, numbness, tremors  SKIN: No itching, burning, rashes, lesions     MEDICATIONS  (STANDING):  cefTRIAXone   IVPB 1000 milliGRAM(s) IV Intermittent every 24 hours  enoxaparin Injectable 40 milliGRAM(s) SubCutaneous every 24 hours  famotidine    Tablet 40 milliGRAM(s) Oral daily  losartan 25 milliGRAM(s) Oral daily  melatonin 3 milliGRAM(s) Oral at bedtime  polyethylene glycol 3350 17 Gram(s) Oral daily  pregabalin 100 milliGRAM(s) Oral two times a day  rosuvastatin 20 milliGRAM(s) Oral at bedtime  senna 2 Tablet(s) Oral at bedtime  sodium chloride 0.9%. 1000 milliLiter(s) (100 mL/Hr) IV Continuous <Continuous>  sucralfate 1 Gram(s) Oral two times a day    MEDICATIONS  (PRN):  oxyCODONE    IR 5 milliGRAM(s) Oral every 6 hours PRN Severe Pain (7 - 10)      Vital Signs Last 24 Hrs  T(C): 36.5 (23 May 2025 14:51), Max: 36.8 (22 May 2025 20:22)  T(F): 97.7 (23 May 2025 14:51), Max: 98.2 (22 May 2025 20:22)  HR: 50 (23 May 2025 14:51) (47 - 59)  BP: 134/76 (23 May 2025 14:51) (115/71 - 134/76)  BP(mean): 85 (23 May 2025 04:50) (77 - 90)  RR: 18 (23 May 2025 14:51) (17 - 18)  SpO2: 96% (23 May 2025 14:51) (95% - 97%)    Parameters below as of 23 May 2025 14:51  Patient On (Oxygen Delivery Method): room air        -----------------------------    PHYSICAL EXAMINATION:  GENERAL: NAD, elderly female lying in bed  HEAD:  Atraumatic, Normocephalic  EYES:  conjunctiva and sclera clear  NECK: Supple, No JVD  CHEST/LUNG: Clear to auscultation bilaterally; No rales, rhonchi, wheezing, or rubs  HEART: Slow rate and regular rhythm; No murmurs, rubs, or gallops  ABDOMEN: Soft, Nontender, Nondistended; Bowel sounds present, no guarding  NERVOUS SYSTEM:  Alert & Oriented X3  : voiding well  EXTREMITIES:  2+ Peripheral Pulses, No clubbing, cyanosis, or edema  SKIN: warm dry                          13.9   5.06  )-----------( 165      ( 22 May 2025 06:57 )             42.0     05-23    143  |  112[H]  |  15  ----------------------------<  118[H]  3.9   |  28  |  0.66    Ca    7.9[L]      23 May 2025 06:50  Phos  2.9     05-23  Mg     2.1     05-23    TPro  x   /  Alb  x   /  TBili  0.3  /  DBili  x   /  AST  x   /  ALT  x   /  AlkPhos  x   05-23          PT/INR - ( 23 May 2025 06:50 )   PT: 11.6 sec;   INR: 1.00 ratio             I&O's Summary        Culture - Urine (collected 21 May 2025 16:51)  Source: Clean Catch Clean Catch (Midstream)  Final Report (22 May 2025 17:50):    <10,000 CFU/mL Normal Urogenital Sugar        CAPILLARY BLOOD GLUCOSE      RADIOLOGY & ADDITIONAL TESTS:

## 2025-05-23 NOTE — DISCHARGE NOTE PROVIDER - HOSPITAL COURSE
69F (from home with daughter, uses cane, no HHA) with PMH HTN, sciatica, presents for 2 days "gastritis pain," abdominal pain, chills, generalized weakness, fatigue, nausea, and headache. Describes abdominal pain as "fireworks," occurring randomly, not associated with food. Reports history of gastritis and had EGD last year that was normal. Denies vomiting or diarrhea. Denies SOB, wheezing, cough, dysuria, or polyuria.    In the ED, /101. S/p IV enalapril. Trop 74. EKG sinus rhythm. S/p  in ED. Started on losartan 50mg and nifedipine 30mg. Admitted to telemetry for ACS r/o, Hypertensive urgency, UTI.    Cardiology consulted. Found to be sinus yvonne 30s-50s on tele. Stres ECHO inconclusive. TTE done showing ****  Due to hypotension, nifedipine d/kane and losartan decreased to 25mg.    Patient is hemodynamically stable and able to tolerate diet prior to discharge. Patient is stable for discharge per attending and is advised to follow up with PCP as outpatient. 69F (from home with daughter, uses cane, no HHA) with PMH HTN, sciatica, presents for 2 days "gastritis pain," abdominal pain, chills, generalized weakness, fatigue, nausea, and headache. Describes abdominal pain as "fireworks," occurring randomly, not associated with food. Reports history of gastritis and had EGD last year that was normal. Denies vomiting or diarrhea. Denies SOB, wheezing, cough, dysuria, or polyuria. In the ED, /101. S/p IV enalapril. Trop 74. EKG sinus rhythm. S/p  in ED. Started on losartan 50mg and nifedipine 30mg. Admitted to telemetry for ACS r/o, Hypertensive urgency, UTI.    Cardiology consulted. Found to be sinus yvonne 30s-50s on tele. Stress ECHO inconclusive. TTE done showing LVEF 70%.   Due to hypotension, nifedipine d/kane and losartan decreased to 25mg.    Patient is hemodynamically stable and able to tolerate diet prior to discharge. Patient is stable for discharge per attending and is advised to follow up with PCP as outpatient.   69F (from home with daughter, uses cane, no HHA) with PMH HTN, sciatica, presents for 2 days "gastritis pain," abdominal pain, chills, generalized weakness, fatigue, nausea, and headache. Describes abdominal pain as "fireworks," occurring randomly, not associated with food. Reports history of gastritis and had EGD last year that was normal. Denies vomiting or diarrhea. Denies SOB, wheezing, cough, dysuria, or polyuria. In the ED, /101. S/p IV enalapril. Trop 74. EKG sinus rhythm. S/p  in ED. Started on losartan 50mg and nifedipine 30mg. Admitted to telemetry for ACS r/o, Hypertensive urgency, UTI.    Cardiology consulted. Found to be sinus yvonne 30s-50s on tele. Stress ECHO inconclusive. TTE  showed normal biv function and no significant valve disease.   Due to hypotension, nifedipine d/kane and losartan decreased to 25mg.    Patient is hemodynamically stable and able to tolerate diet prior to discharge. Patient is stable for discharge per attending and is advised to follow up with PCP as outpatient.   69F (from home with daughter, uses cane, no HHA) with PMH HTN, sciatica, presents for 2 days "gastritis pain," abdominal pain, chills, generalized weakness, fatigue, nausea, and headache. Describes abdominal pain as "fireworks," occurring randomly, not associated with food. Reports history of gastritis and had EGD last year that was normal. Denies vomiting or diarrhea. Denies SOB, wheezing, cough, dysuria, or polyuria. In the ED, /101. S/p IV enalapril. Trop 74. EKG sinus rhythm. S/p  in ED. Started on losartan 50mg and nifedipine 30mg. Admitted to telemetry for ACS r/o, Hypertensive urgency, UTI.    Cardiology consulted. Found to be sinus yvonne 30s-50s on tele. Stress ECHO inconclusive. TTE  showed normal biv function and no significant valve disease.   Due to hypotension, nifedipine d/kane and losartan decreased to 25mg.  Increased BP again, resume home hctz-losartan 100-12.5    Patient is hemodynamically stable and able to tolerate diet prior to discharge. Patient is stable for discharge per attending and is advised to follow up with PCP as outpatient.

## 2025-05-23 NOTE — PROGRESS NOTE ADULT - PROBLEM SELECTOR PLAN 7
reports hx of gastritis but EGD last year that was normal  continue home meds famotidine, sucralfate

## 2025-05-23 NOTE — PROGRESS NOTE ADULT - PROBLEM SELECTOR PLAN 1
C/o 2 days of "gastritis pain," abdominal pain, nausea, weakness, chills, nausea  EKG sinus rhythm, no ischemic changes  Admit for ACS r/o  Troponin 74 in ED, peaked at 76  Monitor on tele  S/p  in ED  C/w ASA 81mg  Cardiology Dr. Mckenzie following  F/u TTE

## 2025-05-23 NOTE — PROGRESS NOTE ADULT - PROBLEM SELECTOR PLAN 3
/101 in ED  Pt was previously on losartan 100mg at home  s/p enalaprilat 1.25 IV in ED  Started on losartan 50mg qd, nifedipine 30mg qd yesterday, however, hypotensive this AM (SBP 80s-90s)  D/c nifedipine  Decrease losartan to 25mg tomorrow AM

## 2025-05-23 NOTE — PROGRESS NOTE ADULT - PROBLEM SELECTOR PLAN 4
Sinus yvonne on tele, 40s-50s during the day, 30s-40s at night  Per patient, at baseline  Stress ECHO: no e/o chronotropic incompetence, however TTE nondiagnostic  - Cardiology Dr. Mckenzie following  - f/u formal echo tomorrow

## 2025-05-23 NOTE — DISCHARGE NOTE PROVIDER - ATTENDING DISCHARGE PHYSICAL EXAMINATION:
SEen this AM, no complaints patient denied ever having chest pain. She reported bradycardia this morning but denied any symptoms.  Her lungs are clear, she is speaking complete sentences without cough. abd soft nontender no lower ext edema.  Patient is medically ready for discharge with outpatient followup

## 2025-05-23 NOTE — DISCHARGE NOTE PROVIDER - NSDCMRMEDTOKEN_GEN_ALL_CORE_FT
famotidine 40 mg oral tablet: 1 tab(s) orally once a day  pregabalin 100 mg oral capsule: 1 cap(s) orally 2 times a day  rosuvastatin 20 mg oral tablet: 1 tab(s) orally once a day (at bedtime)  sucralfate 1 g oral tablet: 1 tab(s) orally once a day  traMADol 50 mg oral tablet: 1 tab(s) orally 4 times a day as needed for  severe pain   famotidine 40 mg oral tablet: 1 tab(s) orally once a day  losartan 25 mg oral tablet: 1 tab(s) orally once a day  pregabalin 100 mg oral capsule: 1 cap(s) orally 2 times a day  rosuvastatin 20 mg oral tablet: 1 tab(s) orally once a day (at bedtime)  sucralfate 1 g oral tablet: 1 tab(s) orally once a day  traMADol 50 mg oral tablet: 1 tab(s) orally 4 times a day as needed for  severe pain   famotidine 40 mg oral tablet: 1 tab(s) orally once a day  losartan-hydrochlorothiazide 100 mg-12.5 mg oral tablet: 1 tab(s) orally once a day  pregabalin 100 mg oral capsule: 1 cap(s) orally 2 times a day  rosuvastatin 20 mg oral tablet: 1 tab(s) orally once a day (at bedtime)  sucralfate 1 g oral tablet: 1 tab(s) orally once a day  traMADol 50 mg oral tablet: 1 tab(s) orally 4 times a day as needed for  severe pain

## 2025-05-23 NOTE — PROGRESS NOTE ADULT - NS ATTEND AMEND GEN_ALL_CORE FT
69F w HTN p/w "gastritis" to ER, found to have elevated BP and mildly elevated cardiac enzymes. Cardiology consulted for atypical chest pain however pt denies cp.     ECG 5/21/25 sinus bradycardia, septal qs, possible LVH  tele sinus bradycardia w PVCs  trop 74-->76-->69    Stress TTE   "CONCLUSIONS   1. No significant exercise-induced arrhythmias. No evidence of chronotropic incompetence with submaximal exercise.   2. The echocardiogram is non-diagnostic due to inadequate heart rate and systolic BP product achieved however, no ischemic changes seen at 65 % of maximum heart rate achieved.   3. Patient achieved 4.60 METs, which is consistent with poor exercise capacity."    On exam NAD, No JVD, RRR no m/r/g, CTAB, no w/r/r    Pt denies cp. Her ECG is grossly unchanged from 2024. She is bradycardiac w PVCs. Stress tte was submaximal HR, however showed that HR augments, pt had to stop due to leg fatigue. She is asymptomatic from bradycardia.   f/u TTE   d/w pt, CCTA can be considered as outpt   Holter or event monitor to be considered as outpt  Above discussed w pt who agrees - please ensure pt has outpt cardiology f/u within 2 weeks of d/c

## 2025-05-23 NOTE — PROGRESS NOTE ADULT - TIME BILLING
Discussion with patient, coordination with cardiology unit, review of cardiac images, further coordination to obtain testing, formulation of plan, medical and medication management, documentation of encounter
Time spent includes direct patient care  (interview and examination of patient), discussion with other providers, support staff and/or patient's family members, review of medical records, ordering diagnostic tests and analyzing results, and documentation excluding time spent doing teaching and procedures.

## 2025-05-23 NOTE — PROGRESS NOTE ADULT - SUBJECTIVE AND OBJECTIVE BOX
NO acute events overnight.    S: Feels well, denies CP, syncope. Main limitation with activity is sciatica. Ambulates with a cane. Clarified she did not have chest pain, but had abdominal discomfort.    Allergies    erythromycin (Swelling)    Intolerances      Vital Signs Last 24 Hrs  T(C): 36.7 (23 May 2025 10:53), Max: 36.8 (22 May 2025 20:22)  T(F): 98.1 (23 May 2025 10:53), Max: 98.2 (22 May 2025 20:22)  HR: 59 (23 May 2025 10:53) (47 - 59)  BP: 117/70 (23 May 2025 10:53) (104/57 - 129/70)  BP(mean): 85 (23 May 2025 04:50) (72 - 90)  RR: 18 (23 May 2025 10:53) (17 - 18)  SpO2: 95% (23 May 2025 10:53) (95% - 98%)    Parameters below as of 23 May 2025 10:53  Patient On (Oxygen Delivery Method): room air        MedsMEDICATIONS  (STANDING):  cefTRIAXone   IVPB 1000 milliGRAM(s) IV Intermittent every 24 hours  enoxaparin Injectable 40 milliGRAM(s) SubCutaneous every 24 hours  famotidine    Tablet 40 milliGRAM(s) Oral daily  losartan 25 milliGRAM(s) Oral daily  melatonin 3 milliGRAM(s) Oral at bedtime  polyethylene glycol 3350 17 Gram(s) Oral daily  pregabalin 100 milliGRAM(s) Oral two times a day  rosuvastatin 20 milliGRAM(s) Oral at bedtime  senna 2 Tablet(s) Oral at bedtime  sodium chloride 0.9%. 1000 milliLiter(s) (100 mL/Hr) IV Continuous <Continuous>  sucralfate 1 Gram(s) Oral two times a day    MEDICATIONS  (PRN):  oxyCODONE    IR 5 milliGRAM(s) Oral every 6 hours PRN Severe Pain (7 - 10)      PHYSICAL EXAM:  GENERAL: NAD, well-groomed, well-developed  NEURO: AAOx3, RADHA b/l, 5/5 b/l UE and 5/5 b/l LE motor strength  LUNG: Lungs clear to auscultation bilaterally, no wheezing, rhonchi, or rales.  HEART: S1, S2, no S3 or S4. yvonne rate in 50's, regular rhythm No murmurs, gallops, or rubs. No JVD  ABDOMEN: Bowel sounds present, abd Soft, Nontender, Nondistended  EXTREMITIES:  2+ Peripheral Pulses b/l, No clubbing, cyanosis, or edema  SKIN: No rashes or lesions    Consultant(s) Notes Reviewed:  [x ] YES  [ ] NO  Care Discussed with Consultants/Other Providers [ x] YES  [ ] NO    LABS:                        13.9   5.06  )-----------( 165      ( 22 May 2025 06:57 )             42.0     05-23    143  |  112[H]  |  15  ----------------------------<  118[H]  3.9   |  28  |  0.66    Ca    7.9[L]      23 May 2025 06:50  Phos  2.9     05-23  Mg     2.1     05-23    TPro  x   /  Alb  x   /  TBili  0.3  /  DBili  x   /  AST  x   /  ALT  x   /  AlkPhos  x   05-23    PT/INR - ( 23 May 2025 06:50 )   PT: 11.6 sec;   INR: 1.00 ratio             RADIOLOGY & ADDITIONAL TESTS:    Telemetry sinus yvonne 50-60's, occ unifocal PVC's, appears less burden last evening and today. No pauses.

## 2025-05-23 NOTE — PROGRESS NOTE ADULT - PROBLEM SELECTOR PLAN 5
c/o chills and abdominal pain, UA+  s/p CTX and 1L NS in ED  continue CTX  f/u urine cx and sensitivity

## 2025-05-23 NOTE — DISCHARGE NOTE PROVIDER - NSDCCPCAREPLAN_GEN_ALL_CORE_FT
PRINCIPAL DISCHARGE DIAGNOSIS  Diagnosis: Myocardial injury  Assessment and Plan of Treatment:       SECONDARY DISCHARGE DIAGNOSES  Diagnosis: Sinus bradycardia  Assessment and Plan of Treatment:     Diagnosis: Hypertension  Assessment and Plan of Treatment:     Diagnosis: Urinary tract infection  Assessment and Plan of Treatment:     Diagnosis: Gastritis  Assessment and Plan of Treatment: You have history of gastritis, a condition where the lining of your stomach becomes inflamed or irritated. This can cause symptoms like stomach pain, nausea, vomiting, and indigestion. Eat smaller, more frequent meals. Avoid spicy, acidic, and fried foods. Limit caffeine and alcohol intake. Stay hydrated with water and non-caffeinated beverages. Continue taking FAMOTIDINE AND SUCRALFATE as prescribed by your doctor. It is important to follow up with a gastroenterologist. Please schedule an appointment with a specialist within the next few weeks.    Diagnosis: Sciatica  Assessment and Plan of Treatment: You have history of sciatica. Please continue to take your home medication, PREGABALIN, as prescribed by your doctor. Please follow up with your doctor for further management.     PRINCIPAL DISCHARGE DIAGNOSIS  Diagnosis: Myocardial injury  Assessment and Plan of Treatment:       SECONDARY DISCHARGE DIAGNOSES  Diagnosis: Sinus bradycardia  Assessment and Plan of Treatment:     Diagnosis: Hypertension  Assessment and Plan of Treatment: You have a history of Hypertension. On this admission, your Blood Pressure was adequately controlled with ________  Check your blood pressure regularly at home, your blood pressure target is 120-140/80-90. If your BP is elevated over 180/110, please seek urgent medical attention. To care for your blood pressure at home maintain a healthy lifestyle, eat a low salt diet and added sugars, avoid fatty food, try to lose weight, and exercise regularly or stay active as tolerated 30 mins X 3 times per week. Notify your doctor if you have any of the following symptoms: (dizziness, lightheadedness, blurry vision, headache, chest pain, shortness of breath.) Please continue taking your home medications as prescribed and follow-up with your PCP in 1 week from discharge to adjust medications as needed.    Diagnosis: Urinary tract infection  Assessment and Plan of Treatment: A urinary tract infection, or UTI, is a general term for an infection anywhere between the kidneys and the urethra (where urine comes out). Most UTIs are bladder infections. They often cause pain or burning when you urinate. Urinalysis was suggestive of urinary tract infection. You were given IV antibiotic CEFTRIAXONE with improvement in your system. Urinary tract infections can cause weakness, confusion, or spread to other organ systems in the body. You were started on IV antibiotics in the hospital with the goal of clearing this infection then transitioned to oral antibiotics. Drink extra water and other fluids for the next day or two. This will help make the urine less concentrated and help wash out the bacteria that are causing the infection. Avoid drinks that are carbonated or have caffeine. They can irritate the bladder. Urinate often. Try to empty your bladder each time. After you use the toilet, wipe from front to back.    Diagnosis: Gastritis  Assessment and Plan of Treatment: You have history of gastritis, a condition where the lining of your stomach becomes inflamed or irritated. This can cause symptoms like stomach pain, nausea, vomiting, and indigestion. Eat smaller, more frequent meals. Avoid spicy, acidic, and fried foods. Limit caffeine and alcohol intake. Stay hydrated with water and non-caffeinated beverages. Continue taking FAMOTIDINE AND SUCRALFATE as prescribed by your doctor. It is important to follow up with a gastroenterologist. Please schedule an appointment with a specialist within the next few weeks.    Diagnosis: Sciatica  Assessment and Plan of Treatment: You have history of sciatica. Please continue to take your home medication, PREGABALIN, as prescribed by your doctor. Please follow up with your doctor for further management.     PRINCIPAL DISCHARGE DIAGNOSIS  Diagnosis: Myocardial injury  Assessment and Plan of Treatment: Initial laboratory tests showed a mild elevation of a protein  in your blood called troponin, which is a protein that is released from heart cells when they are damaged and is used often to test for possible heart attack. Based on your symptoms and elevated troponin you were admitted to a telemetry unit in the hospital to continuously monitor your heat's rate and rhythm. EKG tests that can also help diagnose heart attacks were NORMAL. No abnormal events were seen on telemetry. Cardiac stress test was inconclusive. Echocardiogram to visualize the structure and function of your heart showed NORMAL EJECTION FRACTION. Cardiology's evaluation of your elevated was that it was likely caused by an episode of demand ischemia and NOT A HEART ATTACK. To keep your heart healthy: Take your medications as prescribed. Eat healthy. Stay active. Stay at a healthy weight. Don't smoke and stay away from secondhand smoke. Control your cholesterol and blood pressure. Avoid drinking alcohol or drink only in moderation. Manage stress. Follow up with your primary doctor/cardiologist in 1 week to check on how you are feeling and to discuss further management.      SECONDARY DISCHARGE DIAGNOSES  Diagnosis: Sinus bradycardia  Assessment and Plan of Treatment: Bradycardia is a condition where your heart beats slower than normal. In a healthy adult, the heart usually beats between 60 to 100 times a minute when at rest. With bradycardia, the heart beats less than 60 times per minute. EKG showed bradycardia. You were evaluated by a cardiologist. Your thyroid function was normal. Your echo revelead normal heart function and stress test was normal. It is important to follow up with a cardiologist or your primary care physician (PCP) within one week after discharge to monitor your heart condition and ensure you are on the best treatment plan.    Diagnosis: 2019 novel coronavirus disease (COVID-19)  Assessment and Plan of Treatment: You were tested positive for COVID-19. You had no symptoms so you just had supportive treatment. Please follow latest CDC guidlines. If you test positive for COVID-19, stay home for at least 5 days and isolate from others in your home. You are likely most infectious during these first 5 days. Wear a high-quality mask if you must be around others at home and in public. Do not go places where you are unable to wear a mask. Stay home and separate from others as much as possible. Use a separate bathroom, if possible. Don’t share personal household items, like cups, towels, and utensils. Monitor your symptoms. If you have an emergency warning sign (like trouble breathing), seek emergency medical care immediately.    Diagnosis: Hypertension  Assessment and Plan of Treatment: You have a history of Hypertension. On this admission, your Blood Pressure was adequately controlled with LOSARTAN. Check your blood pressure regularly at home, your blood pressure target is 120-140/80-90. If your BP is elevated over 180/110, please seek urgent medical attention. To care for your blood pressure at home maintain a healthy lifestyle, eat a low salt diet and added sugars, avoid fatty food, try to lose weight, and exercise regularly or stay active as tolerated 30 mins X 3 times per week. Notify your doctor if you have any of the following symptoms: (dizziness, lightheadedness, blurry vision, headache, chest pain, shortness of breath.) Please continue take LOSARTAN 25 MG DAILY as prescribed and follow-up with your PCP in 1 week from discharge to adjust medications as needed.    Diagnosis: Urinary tract infection  Assessment and Plan of Treatment: A urinary tract infection, or UTI, is a general term for an infection anywhere between the kidneys and the urethra (where urine comes out). Most UTIs are bladder infections. They often cause pain or burning when you urinate. Urinalysis was suggestive of urinary tract infection. You were given IV antibiotic CEFTRIAXONE with improvement in your system. Urinary tract infections can cause weakness, confusion, or spread to other organ systems in the body. You were started on IV antibiotics in the hospital with the goal of clearing this infection then transitioned to oral antibiotics. Drink extra water and other fluids for the next day or two. This will help make the urine less concentrated and help wash out the bacteria that are causing the infection. Avoid drinks that are carbonated or have caffeine. They can irritate the bladder. Urinate often. Try to empty your bladder each time. After you use the toilet, wipe from front to back.    Diagnosis: Gastritis  Assessment and Plan of Treatment: You have history of gastritis, a condition where the lining of your stomach becomes inflamed or irritated. This can cause symptoms like stomach pain, nausea, vomiting, and indigestion. Eat smaller, more frequent meals. Avoid spicy, acidic, and fried foods. Limit caffeine and alcohol intake. Stay hydrated with water and non-caffeinated beverages. Continue taking FAMOTIDINE AND SUCRALFATE as prescribed by your doctor. It is important to follow up with a gastroenterologist. Please schedule an appointment with a specialist within the next few weeks.    Diagnosis: Sciatica  Assessment and Plan of Treatment: You have history of sciatica. Please continue to take your home medication, PREGABALIN, as prescribed by your doctor. Please follow up with your doctor for further management.     PRINCIPAL DISCHARGE DIAGNOSIS  Diagnosis: Myocardial injury  Assessment and Plan of Treatment: Initial laboratory tests showed a mild elevation of a protein  in your blood called troponin, which is a protein that is released from heart cells when they are damaged and is used often to test for possible heart attack. Based on your symptoms and elevated troponin you were admitted to a telemetry unit in the hospital to continuously monitor your heat's rate and rhythm. EKG tests that can also help diagnose heart attacks were NORMAL. No abnormal events were seen on telemetry. Cardiac stress test was inconclusive nut at 65% of maximal heart rate, there was no ischemia noted. Echocardiogram to visualize the structure and function of your heart showed NORMAL EJECTION FRACTION. Cardiology's evaluation of your elevated was that it was likely caused by an episode of demand ischemia and NOT A HEART ATTACK. To keep your heart healthy: Take your medications as prescribed. Eat healthy. Stay active. Stay at a healthy weight. Don't smoke and stay away from secondhand smoke. Control your cholesterol and blood pressure. Avoid drinking alcohol or drink only in moderation. Manage stress. Follow up with your primary doctor/cardiologist in 1 week to check on how you are feeling and to discuss further management.  If no cardiologist available, please follow up with Dr. Nenita Mckenzie, plans for outpatient CTangio of the coronary arteries.      SECONDARY DISCHARGE DIAGNOSES  Diagnosis: Hypertension  Assessment and Plan of Treatment: You have a history of Hypertension. On this admission, your Blood Pressure was adequately controlled with LOSARTAN. Check your blood pressure regularly at home, your blood pressure target is 120-140/80-90. If your BP is elevated over 180/110, please seek urgent medical attention. To care for your blood pressure at home maintain a healthy lifestyle, eat a low salt diet and added sugars, avoid fatty food, try to lose weight, and exercise regularly or stay active as tolerated 30 mins X 3 times per week. Notify your doctor if you have any of the following symptoms: (dizziness, lightheadedness, blurry vision, headache, chest pain, shortness of breath.) Please continue home medication losartan-HCTZ combination    Diagnosis: Urinary tract infection  Assessment and Plan of Treatment: A urinary tract infection, or UTI, is a general term for an infection anywhere between the kidneys and the urethra (where urine comes out). Most UTIs are bladder infections. They often cause pain or burning when you urinate. Urinalysis was suggestive of urinary tract infection. You were given IV antibiotic CEFTRIAXONE with improvement in your system. Urinary tract infections can cause weakness, confusion, or spread to other organ systems in the body. You were started on IV antibiotics in the hospital with the goal of clearing this infection then transitioned to oral antibiotics. Drink extra water and other fluids for the next day or two. This will help make the urine less concentrated and help wash out the bacteria that are causing the infection. Avoid drinks that are carbonated or have caffeine. They can irritate the bladder. Urinate often. Try to empty your bladder each time. After you use the toilet, wipe from front to back.    Diagnosis: Sciatica  Assessment and Plan of Treatment: You have history of sciatica. Please continue to take your home medication, PREGABALIN, as prescribed by your doctor. Please follow up with your doctor for further management.    Diagnosis: Sinus bradycardia  Assessment and Plan of Treatment: Bradycardia is a condition where your heart beats slower than normal. In a healthy adult, the heart usually beats between 60 to 100 times a minute when at rest. With bradycardia, the heart beats less than 60 times per minute. EKG showed bradycardia. You were evaluated by a cardiologist. Your thyroid function was normal. Your echo revelead normal heart function and stress test was normal. It is important to follow up with a cardiologist or your primary care physician (PCP) within one week after discharge to monitor your heart condition and ensure you are on the best treatment plan.    Diagnosis: Gastritis  Assessment and Plan of Treatment: You have history of gastritis, a condition where the lining of your stomach becomes inflamed or irritated. This can cause symptoms like stomach pain, nausea, vomiting, and indigestion. Eat smaller, more frequent meals. Avoid spicy, acidic, and fried foods. Limit caffeine and alcohol intake. Stay hydrated with water and non-caffeinated beverages. Continue taking FAMOTIDINE AND SUCRALFATE as prescribed by your doctor. It is important to follow up with a gastroenterologist. Please schedule an appointment with a specialist within the next few weeks.  Your stress test showed increased in heart rate, meaning you have chronotropic competence.    Diagnosis: 2019 novel coronavirus disease (COVID-19)  Assessment and Plan of Treatment: You were tested positive for COVID-19. You had no symptoms so you just had supportive treatment. Please follow latest CDC guidlines. If you test positive for COVID-19, stay home for at least 5 days and isolate from others in your home. You are likely most infectious during these first 5 days. Wear a high-quality mask if you must be around others at home and in public. Do not go places where you are unable to wear a mask. Stay home and separate from others as much as possible. Use a separate bathroom, if possible. Don’t share personal household items, like cups, towels, and utensils. Monitor your symptoms. If you have an emergency warning sign (like trouble breathing), seek emergency medical care immediately.

## 2025-05-23 NOTE — DISCHARGE NOTE PROVIDER - CARE PROVIDER_API CALL
Odell Lewis  Family Medicine  [address], [address]  [place] [address]  Phone: [phone]  Fax: [phone]  Established Patient  Follow Up Time:     Nenita Mckenzie  Cardiology  [address]  [place] [address]  Phone: [phone]  Fax: [phone]  Follow Up Time:

## 2025-05-24 LAB
ANION GAP SERPL CALC-SCNC: 6 MMOL/L — SIGNIFICANT CHANGE UP (ref 5–17)
B PERT DNA SPEC QL NAA+PROBE: SIGNIFICANT CHANGE UP
BUN SERPL-MCNC: 15 MG/DL — SIGNIFICANT CHANGE UP (ref 7–18)
C PNEUM DNA SPEC QL NAA+PROBE: SIGNIFICANT CHANGE UP
CALCIUM SERPL-MCNC: 8.5 MG/DL — SIGNIFICANT CHANGE UP (ref 8.4–10.5)
CHLORIDE SERPL-SCNC: 112 MMOL/L — HIGH (ref 96–108)
CO2 SERPL-SCNC: 24 MMOL/L — SIGNIFICANT CHANGE UP (ref 22–31)
CREAT SERPL-MCNC: 0.56 MG/DL — SIGNIFICANT CHANGE UP (ref 0.5–1.3)
EGFR: 98 ML/MIN/1.73M2 — SIGNIFICANT CHANGE UP
EGFR: 98 ML/MIN/1.73M2 — SIGNIFICANT CHANGE UP
FLUAV H1 2009 PAND RNA SPEC QL NAA+PROBE: SIGNIFICANT CHANGE UP
FLUAV H1 RNA SPEC QL NAA+PROBE: SIGNIFICANT CHANGE UP
FLUAV H3 RNA SPEC QL NAA+PROBE: SIGNIFICANT CHANGE UP
FLUAV SUBTYP SPEC NAA+PROBE: SIGNIFICANT CHANGE UP
FLUBV RNA SPEC QL NAA+PROBE: SIGNIFICANT CHANGE UP
GLUCOSE SERPL-MCNC: 99 MG/DL — SIGNIFICANT CHANGE UP (ref 70–99)
HADV DNA SPEC QL NAA+PROBE: SIGNIFICANT CHANGE UP
HCOV PNL SPEC NAA+PROBE: SIGNIFICANT CHANGE UP
HCT VFR BLD CALC: 42.8 % — SIGNIFICANT CHANGE UP (ref 34.5–45)
HGB BLD-MCNC: 14.1 G/DL — SIGNIFICANT CHANGE UP (ref 11.5–15.5)
HMPV RNA SPEC QL NAA+PROBE: SIGNIFICANT CHANGE UP
HPIV1 RNA SPEC QL NAA+PROBE: SIGNIFICANT CHANGE UP
HPIV2 RNA SPEC QL NAA+PROBE: SIGNIFICANT CHANGE UP
HPIV3 RNA SPEC QL NAA+PROBE: SIGNIFICANT CHANGE UP
HPIV4 RNA SPEC QL NAA+PROBE: SIGNIFICANT CHANGE UP
MAGNESIUM SERPL-MCNC: 2.1 MG/DL — SIGNIFICANT CHANGE UP (ref 1.6–2.6)
MCHC RBC-ENTMCNC: 30.5 PG — SIGNIFICANT CHANGE UP (ref 27–34)
MCHC RBC-ENTMCNC: 32.9 G/DL — SIGNIFICANT CHANGE UP (ref 32–36)
MCV RBC AUTO: 92.6 FL — SIGNIFICANT CHANGE UP (ref 80–100)
NRBC BLD AUTO-RTO: 0 /100 WBCS — SIGNIFICANT CHANGE UP (ref 0–0)
PHOSPHATE SERPL-MCNC: 3.3 MG/DL — SIGNIFICANT CHANGE UP (ref 2.5–4.5)
PLATELET # BLD AUTO: 151 K/UL — SIGNIFICANT CHANGE UP (ref 150–400)
POTASSIUM SERPL-MCNC: 3.5 MMOL/L — SIGNIFICANT CHANGE UP (ref 3.5–5.3)
POTASSIUM SERPL-SCNC: 3.5 MMOL/L — SIGNIFICANT CHANGE UP (ref 3.5–5.3)
RAPID RVP RESULT: DETECTED
RBC # BLD: 4.62 M/UL — SIGNIFICANT CHANGE UP (ref 3.8–5.2)
RBC # FLD: 13.7 % — SIGNIFICANT CHANGE UP (ref 10.3–14.5)
RV+EV RNA SPEC QL NAA+PROBE: SIGNIFICANT CHANGE UP
SARS-COV-2 RNA SPEC QL NAA+PROBE: DETECTED
SODIUM SERPL-SCNC: 142 MMOL/L — SIGNIFICANT CHANGE UP (ref 135–145)
WBC # BLD: 5.84 K/UL — SIGNIFICANT CHANGE UP (ref 3.8–10.5)
WBC # FLD AUTO: 5.84 K/UL — SIGNIFICANT CHANGE UP (ref 3.8–10.5)

## 2025-05-24 PROCEDURE — 99232 SBSQ HOSP IP/OBS MODERATE 35: CPT

## 2025-05-24 RX ORDER — LOSARTAN POTASSIUM 100 MG/1
1 TABLET, FILM COATED ORAL
Qty: 0 | Refills: 0 | DISCHARGE
Start: 2025-05-24

## 2025-05-24 RX ADMIN — ENOXAPARIN SODIUM 40 MILLIGRAM(S): 100 INJECTION SUBCUTANEOUS at 17:39

## 2025-05-24 RX ADMIN — Medication 2 TABLET(S): at 21:49

## 2025-05-24 RX ADMIN — OXYCODONE HYDROCHLORIDE 5 MILLIGRAM(S): 30 TABLET ORAL at 22:42

## 2025-05-24 RX ADMIN — LOSARTAN POTASSIUM 25 MILLIGRAM(S): 100 TABLET, FILM COATED ORAL at 06:40

## 2025-05-24 RX ADMIN — PREGABALIN 100 MILLIGRAM(S): 50 CAPSULE ORAL at 17:37

## 2025-05-24 RX ADMIN — Medication 1 GRAM(S): at 17:37

## 2025-05-24 RX ADMIN — Medication 3 MILLIGRAM(S): at 21:49

## 2025-05-24 RX ADMIN — Medication 1 GRAM(S): at 06:40

## 2025-05-24 RX ADMIN — PREGABALIN 100 MILLIGRAM(S): 50 CAPSULE ORAL at 06:40

## 2025-05-24 RX ADMIN — Medication 40 MILLIGRAM(S): at 12:15

## 2025-05-24 RX ADMIN — ROSUVASTATIN CALCIUM 20 MILLIGRAM(S): 20 TABLET, FILM COATED ORAL at 21:49

## 2025-05-24 RX ADMIN — OXYCODONE HYDROCHLORIDE 5 MILLIGRAM(S): 30 TABLET ORAL at 21:49

## 2025-05-24 RX ADMIN — CEFTRIAXONE 100 MILLIGRAM(S): 500 INJECTION, POWDER, FOR SOLUTION INTRAMUSCULAR; INTRAVENOUS at 20:02

## 2025-05-24 NOTE — PROGRESS NOTE ADULT - ASSESSMENT
Plan:   #New COVID-19 infection without pneumonia  #HTN Urgency (on admission now hypotensive)   #Myocardial Injury  #ACS rule out   #?Gastritis      DC tele monitoring  New covid infection- asymptomatic thus far, will monitor for one day  -troponin peaked 76.9 > 69.4 no need for further repeats   -cardiology following recommend treadmill stress test (no caffeine diet)   - stress test and stress echo not optimal, only reaching 65% of target heart rate, but studies indicate normal heart function  --Discussed with cardio, outpatient CTAngio coronaries  -Very hypertensive in ED< given IV enalapril, Her BP regimen was then changed to decreased dose of losartan and discontinued nifedipine  - BP well controlled on reduced dose losartan  -Tylenol and PPI for symptomatic relief   -can complete CTX x3d; +UA and some suprapubic discomfort on exam, monitor urine culture    -continue pregabalin 100 mg BID, oxy 5 q6hr, sucralfate, pepcid, BM reg (high stool burden on CT)  -bowel regimen for stool burden  -dvt ppx lovenox, no need for repeat cbc, c/w BMP replete lytes prn.   
69F (from home with daughter, uses cane, no HHA) with PMH HTN, sciatica, presents for 2 days "gastritis pain," abdominal pain, chills, generalized weakness, fatigue, nausea, and headache. Admitted for ACS, UTI, elevated BPs.  Cardiology was consulted    #Troponemia  Troponins 74.2->76.2->76.9->69.4    #Bradycardia with PVCs  -patient asymptomatic  -Will check treadmill stress echo to eval for chronotropic incompetence  -Likely will need outpt Cards f/u    #Hypertension  -Normotensive here on losartan 25mg qday  
69F with PMH HTN, sciatica, presented with abdominal pain, nausea. Trops peaked at 76, EKG nsr. Sinus yvonne on tele. Admitted for ACS r/o and UTI, also with /101.
69F with PMH HTN, sciatica, presented with abdominal pain, nausea. Trops peaked at 76, EKG nsr. Sinus yvonne on tele. Admitted for ACS r/o and UTI, also with /101.

## 2025-05-24 NOTE — CHART NOTE - NSCHARTNOTEFT_GEN_A_CORE
TTE 5/22/24 shows normal biv function and no significant valve disease. Cardiology will sign off. Please reconsult as needed.  D/w primary team

## 2025-05-24 NOTE — PROGRESS NOTE ADULT - SUBJECTIVE AND OBJECTIVE BOX
INTERVAL HPI/OVERNIGHT EVENTS:   Roommate tested positive for metapneumovirus so patient was swabbed and became covid positive.  Reports increased fatigue today, no sore throat no cough no sob.       REVIEW OF SYSTEMS:  negative except per hpi    Vital Signs Last 24 Hrs  T(C): 36.9 (24 May 2025 05:10), Max: 36.9 (24 May 2025 05:10)  T(F): 98.4 (24 May 2025 05:10), Max: 98.4 (24 May 2025 05:10)  HR: 47 (24 May 2025 05:10) (47 - 54)  BP: 119/58 (24 May 2025 05:10) (119/58 - 157/67)  BP(mean): 95 (23 May 2025 20:58) (95 - 95)  RR: 18 (24 May 2025 05:10) (18 - 19)  SpO2: 98% (24 May 2025 05:10) (96% - 98%)    Parameters below as of 24 May 2025 05:10  Patient On (Oxygen Delivery Method): room air        PHYSICAL EXAMINATION:  NO distress, lungs clear , heart regular, abd soft nontender                          14.1   5.84  )-----------( 151      ( 24 May 2025 06:30 )             42.8     05-24    142  |  112[H]  |  15  ----------------------------<  99  3.5   |  24  |  0.56    Ca    8.5      24 May 2025 06:30  Phos  3.3     05-24  Mg     2.1     05-24    TPro  x   /  Alb  x   /  TBili  0.3  /  DBili  x   /  AST  x   /  ALT  x   /  AlkPhos  x   05-23          PT/INR - ( 23 May 2025 06:50 )   PT: 11.6 sec;   INR: 1.00 ratio             CAPILLARY BLOOD GLUCOSE      RADIOLOGY & ADDITIONAL TESTS:

## 2025-05-24 NOTE — DISCHARGE NOTE NURSING/CASE MANAGEMENT/SOCIAL WORK - FINANCIAL ASSISTANCE
[facility] provides services at a reduced cost to those who are determined to be eligible through [facility]’s financial assistance program. Information regarding [facility]’s financial assistance program can be found by going to [email or web address] or by calling [phone].

## 2025-05-24 NOTE — DISCHARGE NOTE NURSING/CASE MANAGEMENT/SOCIAL WORK - PATIENT PORTAL LINK FT
You can access the FollowMyHealth Patient Portal offered by [facility] by registering at the following website: [email or web address]. By joining [facility]’s FollowMyHealth portal, you will also be able to view your health information using other applications (apps) compatible with our system.

## 2025-05-24 NOTE — DISCHARGE NOTE NURSING/CASE MANAGEMENT/SOCIAL WORK - NSDCPEFALRISK_GEN_ALL_CORE
For information on Fall & Injury Prevention, visit: [email or web address] OR  [email or web address] OR  https://www.cdc.gov/steadi/patient.html

## 2025-05-25 VITALS
SYSTOLIC BLOOD PRESSURE: 157 MMHG | RESPIRATION RATE: 16 BRPM | HEART RATE: 43 BPM | OXYGEN SATURATION: 98 % | DIASTOLIC BLOOD PRESSURE: 62 MMHG | TEMPERATURE: 98 F

## 2025-05-25 LAB
ANION GAP SERPL CALC-SCNC: 6 MMOL/L — SIGNIFICANT CHANGE UP (ref 5–17)
BUN SERPL-MCNC: 14 MG/DL — SIGNIFICANT CHANGE UP (ref 7–18)
CALCIUM SERPL-MCNC: 8.7 MG/DL — SIGNIFICANT CHANGE UP (ref 8.4–10.5)
CHLORIDE SERPL-SCNC: 112 MMOL/L — HIGH (ref 96–108)
CO2 SERPL-SCNC: 26 MMOL/L — SIGNIFICANT CHANGE UP (ref 22–31)
CREAT SERPL-MCNC: 0.53 MG/DL — SIGNIFICANT CHANGE UP (ref 0.5–1.3)
EGFR: 99 ML/MIN/1.73M2 — SIGNIFICANT CHANGE UP
EGFR: 99 ML/MIN/1.73M2 — SIGNIFICANT CHANGE UP
GLUCOSE SERPL-MCNC: 98 MG/DL — SIGNIFICANT CHANGE UP (ref 70–99)
HCT VFR BLD CALC: 43.5 % — SIGNIFICANT CHANGE UP (ref 34.5–45)
HGB BLD-MCNC: 14.5 G/DL — SIGNIFICANT CHANGE UP (ref 11.5–15.5)
MAGNESIUM SERPL-MCNC: 2.1 MG/DL — SIGNIFICANT CHANGE UP (ref 1.6–2.6)
MCHC RBC-ENTMCNC: 30.8 PG — SIGNIFICANT CHANGE UP (ref 27–34)
MCHC RBC-ENTMCNC: 33.3 G/DL — SIGNIFICANT CHANGE UP (ref 32–36)
MCV RBC AUTO: 92.4 FL — SIGNIFICANT CHANGE UP (ref 80–100)
NRBC BLD AUTO-RTO: 0 /100 WBCS — SIGNIFICANT CHANGE UP (ref 0–0)
PHOSPHATE SERPL-MCNC: 3.6 MG/DL — SIGNIFICANT CHANGE UP (ref 2.5–4.5)
PLATELET # BLD AUTO: 168 K/UL — SIGNIFICANT CHANGE UP (ref 150–400)
POTASSIUM SERPL-MCNC: 3.3 MMOL/L — LOW (ref 3.5–5.3)
POTASSIUM SERPL-SCNC: 3.3 MMOL/L — LOW (ref 3.5–5.3)
RBC # BLD: 4.71 M/UL — SIGNIFICANT CHANGE UP (ref 3.8–5.2)
RBC # FLD: 13.6 % — SIGNIFICANT CHANGE UP (ref 10.3–14.5)
SODIUM SERPL-SCNC: 144 MMOL/L — SIGNIFICANT CHANGE UP (ref 135–145)
WBC # BLD: 6.15 K/UL — SIGNIFICANT CHANGE UP (ref 3.8–10.5)
WBC # FLD AUTO: 6.15 K/UL — SIGNIFICANT CHANGE UP (ref 3.8–10.5)

## 2025-05-25 PROCEDURE — 99239 HOSP IP/OBS DSCHRG MGMT >30: CPT

## 2025-05-25 PROCEDURE — 85027 COMPLETE CBC AUTOMATED: CPT

## 2025-05-25 PROCEDURE — 80061 LIPID PANEL: CPT

## 2025-05-25 PROCEDURE — 80048 BASIC METABOLIC PNL TOTAL CA: CPT

## 2025-05-25 PROCEDURE — 93306 TTE W/DOPPLER COMPLETE: CPT

## 2025-05-25 PROCEDURE — 93017 CV STRESS TEST TRACING ONLY: CPT

## 2025-05-25 PROCEDURE — 93351 STRESS TTE COMPLETE: CPT

## 2025-05-25 PROCEDURE — 99291 CRITICAL CARE FIRST HOUR: CPT

## 2025-05-25 PROCEDURE — 96374 THER/PROPH/DIAG INJ IV PUSH: CPT

## 2025-05-25 PROCEDURE — 93005 ELECTROCARDIOGRAM TRACING: CPT

## 2025-05-25 PROCEDURE — 83690 ASSAY OF LIPASE: CPT

## 2025-05-25 PROCEDURE — 84443 ASSAY THYROID STIM HORMONE: CPT

## 2025-05-25 PROCEDURE — 83605 ASSAY OF LACTIC ACID: CPT

## 2025-05-25 PROCEDURE — 80053 COMPREHEN METABOLIC PANEL: CPT

## 2025-05-25 PROCEDURE — 83735 ASSAY OF MAGNESIUM: CPT

## 2025-05-25 PROCEDURE — 84100 ASSAY OF PHOSPHORUS: CPT

## 2025-05-25 PROCEDURE — 0225U NFCT DS DNA&RNA 21 SARSCOV2: CPT

## 2025-05-25 PROCEDURE — 87637 SARSCOV2&INF A&B&RSV AMP PRB: CPT

## 2025-05-25 PROCEDURE — 81001 URINALYSIS AUTO W/SCOPE: CPT

## 2025-05-25 PROCEDURE — 84484 ASSAY OF TROPONIN QUANT: CPT

## 2025-05-25 PROCEDURE — 85610 PROTHROMBIN TIME: CPT

## 2025-05-25 PROCEDURE — 74177 CT ABD & PELVIS W/CONTRAST: CPT

## 2025-05-25 PROCEDURE — 87086 URINE CULTURE/COLONY COUNT: CPT

## 2025-05-25 PROCEDURE — 36415 COLL VENOUS BLD VENIPUNCTURE: CPT

## 2025-05-25 PROCEDURE — 82247 BILIRUBIN TOTAL: CPT

## 2025-05-25 PROCEDURE — 85025 COMPLETE CBC W/AUTO DIFF WBC: CPT

## 2025-05-25 RX ORDER — LOSARTAN POTASSIUM AND HYDROCHLOROTHIAZIDE 12.5; 5 MG/1; MG/1
1 TABLET ORAL
Refills: 0 | DISCHARGE

## 2025-05-25 RX ADMIN — PREGABALIN 100 MILLIGRAM(S): 50 CAPSULE ORAL at 05:59

## 2025-05-25 RX ADMIN — Medication 1 GRAM(S): at 05:58

## 2025-05-25 RX ADMIN — LOSARTAN POTASSIUM 25 MILLIGRAM(S): 100 TABLET, FILM COATED ORAL at 05:59

## 2025-07-14 PROBLEM — M54.30 SCIATICA, UNSPECIFIED SIDE: Chronic | Status: ACTIVE | Noted: 2025-05-21

## 2025-07-14 PROBLEM — Z87.19 PERSONAL HISTORY OF OTHER DISEASES OF THE DIGESTIVE SYSTEM: Chronic | Status: ACTIVE | Noted: 2025-05-21

## 2025-08-26 PROBLEM — Z00.00 ENCOUNTER FOR PREVENTIVE HEALTH EXAMINATION: Status: ACTIVE | Noted: 2025-08-26

## 2025-09-08 ENCOUNTER — NON-APPOINTMENT (OUTPATIENT)
Age: 70
End: 2025-09-08

## 2025-09-10 ENCOUNTER — APPOINTMENT (OUTPATIENT)
Dept: CARDIOLOGY | Facility: CLINIC | Age: 70
End: 2025-09-10
Payer: MEDICARE

## 2025-09-10 ENCOUNTER — APPOINTMENT (OUTPATIENT)
Dept: SURGERY | Facility: CLINIC | Age: 70
End: 2025-09-10
Payer: MEDICARE

## 2025-09-10 ENCOUNTER — NON-APPOINTMENT (OUTPATIENT)
Age: 70
End: 2025-09-10

## 2025-09-10 VITALS
DIASTOLIC BLOOD PRESSURE: 57 MMHG | HEART RATE: 63 BPM | SYSTOLIC BLOOD PRESSURE: 103 MMHG | OXYGEN SATURATION: 96 % | TEMPERATURE: 97.6 F

## 2025-09-10 VITALS
BODY MASS INDEX: 28 KG/M2 | DIASTOLIC BLOOD PRESSURE: 78 MMHG | HEIGHT: 64.17 IN | HEART RATE: 61 BPM | SYSTOLIC BLOOD PRESSURE: 147 MMHG | WEIGHT: 164 LBS | OXYGEN SATURATION: 98 %

## 2025-09-10 DIAGNOSIS — M54.30 SCIATICA, UNSPECIFIED SIDE: ICD-10-CM

## 2025-09-10 DIAGNOSIS — E78.2 MIXED HYPERLIPIDEMIA: ICD-10-CM

## 2025-09-10 DIAGNOSIS — Z80.9 FAMILY HISTORY OF MALIGNANT NEOPLASM, UNSPECIFIED: ICD-10-CM

## 2025-09-10 DIAGNOSIS — I10 ESSENTIAL (PRIMARY) HYPERTENSION: ICD-10-CM

## 2025-09-10 DIAGNOSIS — Z63.4 DISAPPEARANCE AND DEATH OF FAMILY MEMBER: ICD-10-CM

## 2025-09-10 DIAGNOSIS — Z78.9 OTHER SPECIFIED HEALTH STATUS: ICD-10-CM

## 2025-09-10 DIAGNOSIS — K80.20 CALCULUS OF GALLBLADDER W/OUT CHOLECYSTITIS W/OUT OBSTRUCTION: ICD-10-CM

## 2025-09-10 DIAGNOSIS — R79.89 OTHER SPECIFIED ABNORMAL FINDINGS OF BLOOD CHEMISTRY: ICD-10-CM

## 2025-09-10 PROCEDURE — 99213 OFFICE O/P EST LOW 20 MIN: CPT

## 2025-09-10 PROCEDURE — 99204 OFFICE O/P NEW MOD 45 MIN: CPT

## 2025-09-10 PROCEDURE — 93000 ELECTROCARDIOGRAM COMPLETE: CPT

## 2025-09-10 RX ORDER — TRAMADOL HYDROCHLORIDE 50 MG/1
50 TABLET, COATED ORAL
Refills: 0 | Status: ACTIVE | COMMUNITY
Start: 2025-09-10

## 2025-09-10 RX ORDER — FAMOTIDINE 40 MG/1
40 TABLET, FILM COATED ORAL DAILY
Refills: 0 | Status: ACTIVE | COMMUNITY
Start: 2025-09-10

## 2025-09-10 RX ORDER — LACTASE 3000 UNIT
TABLET ORAL
Refills: 0 | Status: ACTIVE | COMMUNITY

## 2025-09-10 RX ORDER — LOSARTAN POTASSIUM 100 MG/1
100 TABLET, FILM COATED ORAL DAILY
Refills: 0 | Status: ACTIVE | COMMUNITY
Start: 2025-09-10

## 2025-09-10 RX ORDER — ROSUVASTATIN CALCIUM 20 MG/1
20 TABLET, FILM COATED ORAL DAILY
Refills: 0 | Status: ACTIVE | COMMUNITY
Start: 2025-09-10

## 2025-09-10 RX ORDER — SUCRALFATE 1 G/1
TABLET ORAL
Refills: 0 | Status: ACTIVE | COMMUNITY

## 2025-09-10 SDOH — SOCIAL STABILITY - SOCIAL INSECURITY: DISSAPEARANCE AND DEATH OF FAMILY MEMBER: Z63.4

## 2025-09-16 ENCOUNTER — EMERGENCY (EMERGENCY)
Facility: HOSPITAL | Age: 70
LOS: 1 days | End: 2025-09-16
Attending: STUDENT IN AN ORGANIZED HEALTH CARE EDUCATION/TRAINING PROGRAM
Payer: MEDICARE

## 2025-09-16 VITALS
SYSTOLIC BLOOD PRESSURE: 103 MMHG | HEART RATE: 61 BPM | DIASTOLIC BLOOD PRESSURE: 62 MMHG | TEMPERATURE: 98 F | RESPIRATION RATE: 18 BRPM | OXYGEN SATURATION: 99 %

## 2025-09-16 VITALS
WEIGHT: 164.02 LBS | TEMPERATURE: 98 F | DIASTOLIC BLOOD PRESSURE: 65 MMHG | HEIGHT: 66 IN | SYSTOLIC BLOOD PRESSURE: 100 MMHG | RESPIRATION RATE: 16 BRPM | OXYGEN SATURATION: 96 % | HEART RATE: 56 BPM

## 2025-09-16 DIAGNOSIS — Z96.643 PRESENCE OF ARTIFICIAL HIP JOINT, BILATERAL: Chronic | ICD-10-CM

## 2025-09-16 DIAGNOSIS — R29.91 UNSPECIFIED SYMPTOMS AND SIGNS INVOLVING THE MUSCULOSKELETAL SYSTEM: Chronic | ICD-10-CM

## 2025-09-16 DIAGNOSIS — Z90.710 ACQUIRED ABSENCE OF BOTH CERVIX AND UTERUS: Chronic | ICD-10-CM

## 2025-09-16 PROCEDURE — 73610 X-RAY EXAM OF ANKLE: CPT

## 2025-09-16 PROCEDURE — 73590 X-RAY EXAM OF LOWER LEG: CPT | Mod: 26,LT

## 2025-09-16 PROCEDURE — 99284 EMERGENCY DEPT VISIT MOD MDM: CPT

## 2025-09-16 PROCEDURE — 72192 CT PELVIS W/O DYE: CPT | Mod: 26

## 2025-09-16 PROCEDURE — 71250 CT THORAX DX C-: CPT

## 2025-09-16 PROCEDURE — 72192 CT PELVIS W/O DYE: CPT

## 2025-09-16 PROCEDURE — 73080 X-RAY EXAM OF ELBOW: CPT | Mod: 26,LT

## 2025-09-16 PROCEDURE — 99284 EMERGENCY DEPT VISIT MOD MDM: CPT | Mod: 25

## 2025-09-16 PROCEDURE — 73562 X-RAY EXAM OF KNEE 3: CPT

## 2025-09-16 PROCEDURE — 90715 TDAP VACCINE 7 YRS/> IM: CPT

## 2025-09-16 PROCEDURE — 73562 X-RAY EXAM OF KNEE 3: CPT | Mod: 26,LT

## 2025-09-16 PROCEDURE — 73590 X-RAY EXAM OF LOWER LEG: CPT

## 2025-09-16 PROCEDURE — 71250 CT THORAX DX C-: CPT | Mod: 26

## 2025-09-16 PROCEDURE — 73610 X-RAY EXAM OF ANKLE: CPT | Mod: 26,LT

## 2025-09-16 PROCEDURE — 73080 X-RAY EXAM OF ELBOW: CPT

## 2025-09-16 PROCEDURE — 90471 IMMUNIZATION ADMIN: CPT

## 2025-09-16 RX ORDER — LIDOCAINE HYDROCHLORIDE 20 MG/ML
1 JELLY TOPICAL ONCE
Refills: 0 | Status: COMPLETED | OUTPATIENT
Start: 2025-09-16 | End: 2025-09-16

## 2025-09-16 RX ORDER — IBUPROFEN 200 MG
600 TABLET ORAL ONCE
Refills: 0 | Status: COMPLETED | OUTPATIENT
Start: 2025-09-16 | End: 2025-09-16

## 2025-09-16 RX ORDER — ACETAMINOPHEN 500 MG/5ML
975 LIQUID (ML) ORAL ONCE
Refills: 0 | Status: COMPLETED | OUTPATIENT
Start: 2025-09-16 | End: 2025-09-16

## 2025-09-16 RX ORDER — METHOCARBAMOL 500 MG/1
1500 TABLET, FILM COATED ORAL ONCE
Refills: 0 | Status: COMPLETED | OUTPATIENT
Start: 2025-09-16 | End: 2025-09-16

## 2025-09-16 RX ORDER — METHOCARBAMOL 500 MG/1
2 TABLET, FILM COATED ORAL
Qty: 18 | Refills: 0
Start: 2025-09-16 | End: 2025-09-18

## 2025-09-16 RX ADMIN — METHOCARBAMOL 1500 MILLIGRAM(S): 500 TABLET, FILM COATED ORAL at 18:50

## 2025-09-16 RX ADMIN — Medication 975 MILLIGRAM(S): at 18:50

## 2025-09-16 RX ADMIN — Medication 600 MILLIGRAM(S): at 18:49

## 2025-09-16 RX ADMIN — LIDOCAINE HYDROCHLORIDE 1 PATCH: 20 JELLY TOPICAL at 18:50

## 2025-09-19 ENCOUNTER — APPOINTMENT (OUTPATIENT)
Dept: CARDIOLOGY | Facility: CLINIC | Age: 70
End: 2025-09-19

## 2025-09-19 ENCOUNTER — APPOINTMENT (OUTPATIENT)
Dept: CT IMAGING | Facility: CLINIC | Age: 70
End: 2025-09-19